# Patient Record
Sex: FEMALE | Race: BLACK OR AFRICAN AMERICAN | NOT HISPANIC OR LATINO
[De-identification: names, ages, dates, MRNs, and addresses within clinical notes are randomized per-mention and may not be internally consistent; named-entity substitution may affect disease eponyms.]

---

## 2017-02-03 ENCOUNTER — APPOINTMENT (OUTPATIENT)
Dept: ULTRASOUND IMAGING | Facility: CLINIC | Age: 27
End: 2017-02-03

## 2017-02-03 ENCOUNTER — OUTPATIENT (OUTPATIENT)
Dept: OUTPATIENT SERVICES | Facility: HOSPITAL | Age: 27
LOS: 1 days | End: 2017-02-03
Payer: COMMERCIAL

## 2017-02-03 DIAGNOSIS — N63 UNSPECIFIED LUMP IN BREAST: ICD-10-CM

## 2017-02-03 PROCEDURE — 76642 ULTRASOUND BREAST LIMITED: CPT

## 2017-02-14 ENCOUNTER — APPOINTMENT (OUTPATIENT)
Dept: INTERNAL MEDICINE | Facility: CLINIC | Age: 27
End: 2017-02-14

## 2017-02-14 VITALS
TEMPERATURE: 98 F | OXYGEN SATURATION: 98 % | DIASTOLIC BLOOD PRESSURE: 80 MMHG | HEART RATE: 92 BPM | HEIGHT: 64 IN | BODY MASS INDEX: 22.02 KG/M2 | SYSTOLIC BLOOD PRESSURE: 120 MMHG | WEIGHT: 129 LBS

## 2017-02-14 DIAGNOSIS — D72.819 DECREASED WHITE BLOOD CELL COUNT, UNSPECIFIED: ICD-10-CM

## 2017-02-15 LAB
25(OH)D3 SERPL-MCNC: 15.1 NG/ML
BASOPHILS # BLD AUTO: 0.01 K/UL
BASOPHILS NFR BLD AUTO: 0.2 %
C TRACH DNA SPEC QL NAA+PROBE: NORMAL
C TRACH RRNA SPEC QL NAA+PROBE: NORMAL
EOSINOPHIL # BLD AUTO: 0.01 K/UL
EOSINOPHIL NFR BLD AUTO: 0.2 %
HCT VFR BLD CALC: 38.2 %
HGB BLD-MCNC: 12.1 G/DL
HIV1+2 AB SPEC QL IA.RAPID: NONREACTIVE
IMM GRANULOCYTES NFR BLD AUTO: 0 %
LYMPHOCYTES # BLD AUTO: 1.81 K/UL
LYMPHOCYTES NFR BLD AUTO: 44 %
MAN DIFF?: NORMAL
MCHC RBC-ENTMCNC: 30.5 PG
MCHC RBC-ENTMCNC: 31.7 GM/DL
MCV RBC AUTO: 96.2 FL
MONOCYTES # BLD AUTO: 0.3 K/UL
MONOCYTES NFR BLD AUTO: 7.3 %
N GONORRHOEA DNA SPEC QL NAA+PROBE: NORMAL
N GONORRHOEA RRNA SPEC QL NAA+PROBE: NORMAL
NEUTROPHILS # BLD AUTO: 1.98 K/UL
NEUTROPHILS NFR BLD AUTO: 48.3 %
PLATELET # BLD AUTO: 236 K/UL
RBC # BLD: 3.97 M/UL
RBC # FLD: 12.6 %
SICKLE CELLS BLD QL SMEAR: NEGATIVE
SOURCE AMPLIFICATION: NORMAL
T PALLIDUM AB SER QL IA: NEGATIVE
WBC # FLD AUTO: 4.11 K/UL

## 2018-02-06 ENCOUNTER — APPOINTMENT (OUTPATIENT)
Dept: INTERNAL MEDICINE | Facility: CLINIC | Age: 28
End: 2018-02-06
Payer: COMMERCIAL

## 2018-02-06 VITALS
BODY MASS INDEX: 22.53 KG/M2 | HEIGHT: 64 IN | DIASTOLIC BLOOD PRESSURE: 70 MMHG | TEMPERATURE: 98.8 F | HEART RATE: 104 BPM | WEIGHT: 132 LBS | SYSTOLIC BLOOD PRESSURE: 120 MMHG | OXYGEN SATURATION: 98 %

## 2018-02-06 LAB — S PYO AG SPEC QL IA: NEGATIVE

## 2018-02-06 PROCEDURE — 87880 STREP A ASSAY W/OPTIC: CPT | Mod: QW

## 2018-02-06 PROCEDURE — 99213 OFFICE O/P EST LOW 20 MIN: CPT | Mod: 25

## 2018-02-06 RX ORDER — HYDROCORTISONE VALERATE 2 MG/G
0.2 CREAM TOPICAL
Qty: 15 | Refills: 0 | Status: DISCONTINUED | COMMUNITY
Start: 2017-09-08

## 2018-02-06 RX ORDER — ONDANSETRON 8 MG/1
8 TABLET, ORALLY DISINTEGRATING ORAL
Qty: 8 | Refills: 0 | Status: DISCONTINUED | COMMUNITY
Start: 2018-01-22

## 2018-02-06 RX ORDER — AMOXICILLIN 875 MG/1
875 TABLET, FILM COATED ORAL
Qty: 20 | Refills: 0 | Status: DISCONTINUED | COMMUNITY
Start: 2018-02-03

## 2018-02-06 RX ORDER — PREDNISONE 20 MG/1
20 TABLET ORAL
Qty: 9 | Refills: 0 | Status: DISCONTINUED | COMMUNITY
Start: 2017-09-08

## 2018-02-21 ENCOUNTER — APPOINTMENT (OUTPATIENT)
Dept: INTERNAL MEDICINE | Facility: CLINIC | Age: 28
End: 2018-02-21
Payer: COMMERCIAL

## 2018-02-21 VITALS
SYSTOLIC BLOOD PRESSURE: 110 MMHG | HEIGHT: 64 IN | TEMPERATURE: 98.3 F | WEIGHT: 134 LBS | DIASTOLIC BLOOD PRESSURE: 72 MMHG | OXYGEN SATURATION: 98 % | BODY MASS INDEX: 22.88 KG/M2 | HEART RATE: 98 BPM

## 2018-02-21 PROCEDURE — 99395 PREV VISIT EST AGE 18-39: CPT | Mod: 25

## 2018-02-21 PROCEDURE — 36415 COLL VENOUS BLD VENIPUNCTURE: CPT

## 2018-02-21 RX ORDER — BENZONATATE 100 MG/1
100 CAPSULE ORAL 3 TIMES DAILY
Qty: 30 | Refills: 1 | Status: DISCONTINUED | COMMUNITY
Start: 2018-02-06 | End: 2018-02-21

## 2018-02-21 RX ORDER — LORATADINE 5 MG/5 ML
10-240 SOLUTION, ORAL ORAL DAILY
Qty: 1 | Refills: 0 | Status: DISCONTINUED | COMMUNITY
Start: 2018-02-06 | End: 2018-02-21

## 2018-02-22 LAB
ALBUMIN SERPL ELPH-MCNC: 4.7 G/DL
ALP BLD-CCNC: 53 U/L
ALT SERPL-CCNC: 12 U/L
ANION GAP SERPL CALC-SCNC: 16 MMOL/L
AST SERPL-CCNC: 17 U/L
BASOPHILS # BLD AUTO: 0.01 K/UL
BASOPHILS NFR BLD AUTO: 0.2 %
BILIRUB SERPL-MCNC: 0.3 MG/DL
BUN SERPL-MCNC: 14 MG/DL
CALCIUM SERPL-MCNC: 9.8 MG/DL
CHLORIDE SERPL-SCNC: 102 MMOL/L
CO2 SERPL-SCNC: 20 MMOL/L
CREAT SERPL-MCNC: 0.72 MG/DL
EOSINOPHIL # BLD AUTO: 0.03 K/UL
EOSINOPHIL NFR BLD AUTO: 0.5 %
GLUCOSE SERPL-MCNC: 79 MG/DL
HBA1C MFR BLD HPLC: 5.6 %
HCT VFR BLD CALC: 37.1 %
HGB BLD-MCNC: 12.1 G/DL
HIV1+2 AB SPEC QL IA.RAPID: NONREACTIVE
IMM GRANULOCYTES NFR BLD AUTO: 0 %
LYMPHOCYTES # BLD AUTO: 2.86 K/UL
LYMPHOCYTES NFR BLD AUTO: 50.7 %
MAN DIFF?: NORMAL
MCHC RBC-ENTMCNC: 30.2 PG
MCHC RBC-ENTMCNC: 32.6 GM/DL
MCV RBC AUTO: 92.5 FL
MONOCYTES # BLD AUTO: 0.37 K/UL
MONOCYTES NFR BLD AUTO: 6.6 %
NEUTROPHILS # BLD AUTO: 2.37 K/UL
NEUTROPHILS NFR BLD AUTO: 42 %
PLATELET # BLD AUTO: 248 K/UL
POTASSIUM SERPL-SCNC: 4.1 MMOL/L
PROT SERPL-MCNC: 8.1 G/DL
RBC # BLD: 4.01 M/UL
RBC # FLD: 13.3 %
SODIUM SERPL-SCNC: 138 MMOL/L
T PALLIDUM AB SER QL IA: NEGATIVE
TSH SERPL-ACNC: 0.8 UIU/ML
WBC # FLD AUTO: 5.64 K/UL

## 2018-02-23 LAB
C TRACH RRNA SPEC QL NAA+PROBE: NOT DETECTED
N GONORRHOEA RRNA SPEC QL NAA+PROBE: NOT DETECTED
SOURCE AMPLIFICATION: NORMAL

## 2018-02-24 LAB
HGB A MFR BLD: 97.1 %
HGB A2 MFR BLD: 2.9 %
HGB FRACT BLD-IMP: NORMAL

## 2018-03-12 ENCOUNTER — APPOINTMENT (OUTPATIENT)
Dept: INTERNAL MEDICINE | Facility: CLINIC | Age: 28
End: 2018-03-12

## 2018-03-19 ENCOUNTER — APPOINTMENT (OUTPATIENT)
Dept: INTERNAL MEDICINE | Facility: CLINIC | Age: 28
End: 2018-03-19
Payer: COMMERCIAL

## 2018-03-19 PROCEDURE — 90715 TDAP VACCINE 7 YRS/> IM: CPT

## 2018-03-19 PROCEDURE — 90471 IMMUNIZATION ADMIN: CPT

## 2018-07-30 ENCOUNTER — RESULT REVIEW (OUTPATIENT)
Age: 28
End: 2018-07-30

## 2018-08-01 ENCOUNTER — APPOINTMENT (OUTPATIENT)
Dept: INTERNAL MEDICINE | Facility: CLINIC | Age: 28
End: 2018-08-01
Payer: COMMERCIAL

## 2018-08-01 VITALS
TEMPERATURE: 98.6 F | WEIGHT: 132 LBS | BODY MASS INDEX: 22.53 KG/M2 | HEART RATE: 95 BPM | SYSTOLIC BLOOD PRESSURE: 108 MMHG | DIASTOLIC BLOOD PRESSURE: 70 MMHG | HEIGHT: 64 IN | OXYGEN SATURATION: 98 %

## 2018-08-01 PROCEDURE — 99213 OFFICE O/P EST LOW 20 MIN: CPT

## 2018-08-01 NOTE — ASSESSMENT
[FreeTextEntry1] : # GERD \par \par - lifestyle changes stressed / do not eat late / avoid fatty foods and caffiene tracee late in the day\par - start PPI \par - Bc she c/o spasms and food going down slowly , will refer to GI \par

## 2018-08-01 NOTE — PHYSICAL EXAM
[No Acute Distress] : no acute distress [Well Nourished] : well nourished [Well Developed] : well developed [Supple] : supple [No Respiratory Distress] : no respiratory distress  [Clear to Auscultation] : lungs were clear to auscultation bilaterally [Normal Rate] : normal rate  [Regular Rhythm] : with a regular rhythm [Normal S1, S2] : normal S1 and S2 [Soft] : abdomen soft [Non Tender] : non-tender [No HSM] : no HSM [Normal Bowel Sounds] : normal bowel sounds

## 2018-08-01 NOTE — HISTORY OF PRESENT ILLNESS
[FreeTextEntry8] : pt reports that for the last 3 days she has been having burning in the throat , and retrosternal area\par she denies any use of NSAID \par she reports that she feels as if her esophagus goes into "spasms" when she eats , bc the food goes down slowly , however she denies  any difficulty in swallowing solids \par she has been eating a lot of "junk" food , eats late \par eats a lot of chocolate \par \par LMP 7/16

## 2018-10-16 ENCOUNTER — APPOINTMENT (OUTPATIENT)
Dept: INTERNAL MEDICINE | Facility: CLINIC | Age: 28
End: 2018-10-16
Payer: COMMERCIAL

## 2018-10-16 VITALS
HEIGHT: 64 IN | WEIGHT: 134 LBS | OXYGEN SATURATION: 98 % | SYSTOLIC BLOOD PRESSURE: 104 MMHG | BODY MASS INDEX: 22.88 KG/M2 | DIASTOLIC BLOOD PRESSURE: 80 MMHG | HEART RATE: 76 BPM

## 2018-10-16 PROCEDURE — 99213 OFFICE O/P EST LOW 20 MIN: CPT

## 2018-10-16 NOTE — HISTORY OF PRESENT ILLNESS
[FreeTextEntry1] : pt was seen in ED bc swollen LN \par she was started on augmentin , now  resolved \par she also reports that the GERD got better with change in diet \par \par the PPI helped but still has occasional issues \par \par no dysphagia\par \par

## 2018-12-05 ENCOUNTER — APPOINTMENT (OUTPATIENT)
Dept: INTERNAL MEDICINE | Facility: CLINIC | Age: 28
End: 2018-12-05
Payer: COMMERCIAL

## 2018-12-05 VITALS
DIASTOLIC BLOOD PRESSURE: 80 MMHG | BODY MASS INDEX: 22.88 KG/M2 | HEIGHT: 64 IN | OXYGEN SATURATION: 98 % | WEIGHT: 134 LBS | HEART RATE: 81 BPM | SYSTOLIC BLOOD PRESSURE: 112 MMHG

## 2018-12-05 PROCEDURE — 99213 OFFICE O/P EST LOW 20 MIN: CPT

## 2018-12-05 NOTE — HISTORY OF PRESENT ILLNESS
[FreeTextEntry8] : pt reports that she has numbness in both hands tracee the R >L \par Comes and goes \par no weakness \par works in the dietary department , \par \par \par reports she still has some epigastric pressure when she eats fatty food \par did not see GI \par

## 2018-12-05 NOTE — ASSESSMENT
[FreeTextEntry1] : 1) numbness of both hands / intermittent / Tinel negative\par - neuro eval\par \par 2) GERD ? abd "pressure in the epigastric area" \par - US \par - GI follow up - pt refuses at present \par \par 3) L hip pain \par - x-ray L hip and pelvis

## 2018-12-05 NOTE — PHYSICAL EXAM
[No Acute Distress] : no acute distress [Well Nourished] : well nourished [Well Developed] : well developed [No JVD] : no jugular venous distention [Supple] : supple [No Respiratory Distress] : no respiratory distress  [Clear to Auscultation] : lungs were clear to auscultation bilaterally [No Accessory Muscle Use] : no accessory muscle use [Normal Rate] : normal rate  [Regular Rhythm] : with a regular rhythm [Normal S1, S2] : normal S1 and S2 [No Edema] : there was no peripheral edema [Soft] : abdomen soft [Non Tender] : non-tender [No HSM] : no HSM [Normal Bowel Sounds] : normal bowel sounds

## 2019-04-17 ENCOUNTER — APPOINTMENT (OUTPATIENT)
Dept: INTERNAL MEDICINE | Facility: CLINIC | Age: 29
End: 2019-04-17
Payer: COMMERCIAL

## 2019-04-17 VITALS
BODY MASS INDEX: 21.68 KG/M2 | TEMPERATURE: 98.5 F | HEIGHT: 64 IN | WEIGHT: 127 LBS | DIASTOLIC BLOOD PRESSURE: 72 MMHG | SYSTOLIC BLOOD PRESSURE: 130 MMHG | HEART RATE: 91 BPM | OXYGEN SATURATION: 98 %

## 2019-04-17 DIAGNOSIS — Z83.2 FAMILY HISTORY OF DISEASES OF THE BLOOD AND BLOOD-FORMING ORGANS AND CERTAIN DISORDERS INVOLVING THE IMMUNE MECHANISM: ICD-10-CM

## 2019-04-17 DIAGNOSIS — Z83.3 FAMILY HISTORY OF DIABETES MELLITUS: ICD-10-CM

## 2019-04-17 PROCEDURE — 36415 COLL VENOUS BLD VENIPUNCTURE: CPT

## 2019-04-17 PROCEDURE — 99395 PREV VISIT EST AGE 18-39: CPT | Mod: 25

## 2019-04-17 NOTE — HISTORY OF PRESENT ILLNESS
[FreeTextEntry1] : CPE \par \par  still has some occasional GERD , but only when she eats " junk' \par no dysphagia , no vomiting \par  also has occasional low back , L groin pain "tightness" \par

## 2019-04-17 NOTE — ASSESSMENT
[FreeTextEntry1] : # CPE \par \par - diet / exercise discussed \par - UTD PAP ./ Tdap \par -check labs/ lipid and A1C \par - STD screen \par \par -refuses to go for US abd / x-ray ( see last note)

## 2019-04-17 NOTE — HEALTH RISK ASSESSMENT
[Good] : ~his/her~  mood as  good [] : Yes [de-identified] : occasionally  [0] : 2) Feeling down, depressed, or hopeless: Not at all (0) [de-identified] : N  [de-identified] : eats a lot of junk food  [PapSmearDate] : 2018

## 2019-04-17 NOTE — PHYSICAL EXAM
[Well Nourished] : well nourished [No Acute Distress] : no acute distress [Normal Sclera/Conjunctiva] : normal sclera/conjunctiva [Well-Appearing] : well-appearing [Well Developed] : well developed [EOMI] : extraocular movements intact [PERRL] : pupils equal round and reactive to light [Normal Outer Ear/Nose] : the outer ears and nose were normal in appearance [No JVD] : no jugular venous distention [Supple] : supple [Normal Oropharynx] : the oropharynx was normal [No Respiratory Distress] : no respiratory distress  [Thyroid Normal, No Nodules] : the thyroid was normal and there were no nodules present [No Lymphadenopathy] : no lymphadenopathy [Clear to Auscultation] : lungs were clear to auscultation bilaterally [No Accessory Muscle Use] : no accessory muscle use [Regular Rhythm] : with a regular rhythm [Normal Rate] : normal rate  [Normal S1, S2] : normal S1 and S2 [No Edema] : there was no peripheral edema [Non Tender] : non-tender [No Extremity Clubbing/Cyanosis] : no extremity clubbing/cyanosis [Soft] : abdomen soft [Non-distended] : non-distended [No HSM] : no HSM [Normal Bowel Sounds] : normal bowel sounds [Normal Posterior Cervical Nodes] : no posterior cervical lymphadenopathy [Normal Supraclavicular Nodes] : no supraclavicular lymphadenopathy [Normal Anterior Cervical Nodes] : no anterior cervical lymphadenopathy [No Spinal Tenderness] : no spinal tenderness [No Joint Swelling] : no joint swelling [No CVA Tenderness] : no CVA  tenderness [No Rash] : no rash [Grossly Normal Strength/Tone] : grossly normal strength/tone [Normal Gait] : normal gait [No Skin Lesions] : no skin lesions [Coordination Grossly Intact] : coordination grossly intact [Memory Grossly Normal] : memory grossly normal [Speech Grossly Normal] : speech grossly normal [No Focal Deficits] : no focal deficits [Normal Mood] : the mood was normal [Normal Insight/Judgement] : insight and judgment were intact

## 2019-04-18 LAB
CHOLEST SERPL-MCNC: 153 MG/DL
CHOLEST/HDLC SERPL: 2.3 RATIO
ESTIMATED AVERAGE GLUCOSE: 114 MG/DL
HBA1C MFR BLD HPLC: 5.6 %
HDLC SERPL-MCNC: 66 MG/DL
HIV1+2 AB SPEC QL IA.RAPID: NONREACTIVE
LDLC SERPL CALC-MCNC: 81 MG/DL
T PALLIDUM AB SER QL IA: NEGATIVE
TRIGL SERPL-MCNC: 29 MG/DL
TSH SERPL-ACNC: 1.06 UIU/ML

## 2020-01-21 ENCOUNTER — APPOINTMENT (OUTPATIENT)
Dept: INTERNAL MEDICINE | Facility: CLINIC | Age: 30
End: 2020-01-21
Payer: COMMERCIAL

## 2020-01-21 VITALS
BODY MASS INDEX: 21.51 KG/M2 | SYSTOLIC BLOOD PRESSURE: 132 MMHG | HEART RATE: 84 BPM | TEMPERATURE: 98.5 F | HEIGHT: 64 IN | OXYGEN SATURATION: 98 % | WEIGHT: 126 LBS | DIASTOLIC BLOOD PRESSURE: 80 MMHG

## 2020-01-21 DIAGNOSIS — R20.2 PARESTHESIA OF SKIN: ICD-10-CM

## 2020-01-21 PROCEDURE — 99213 OFFICE O/P EST LOW 20 MIN: CPT

## 2020-01-21 RX ORDER — CHOLECALCIFEROL (VITAMIN D3) 1250 MCG
1.25 MG CAPSULE ORAL
Qty: 12 | Refills: 0 | Status: DISCONTINUED | COMMUNITY
Start: 2017-02-15 | End: 2020-01-21

## 2020-01-21 RX ORDER — OMEPRAZOLE 40 MG/1
40 CAPSULE, DELAYED RELEASE ORAL
Qty: 30 | Refills: 1 | Status: DISCONTINUED | COMMUNITY
Start: 2018-08-01 | End: 2020-01-21

## 2020-01-21 NOTE — REVIEW OF SYSTEMS
[Fatigue] : fatigue [Negative] : Respiratory [de-identified] : hives intermittently  [de-identified] : see H 7 P

## 2020-01-21 NOTE — HISTORY OF PRESENT ILLNESS
[FreeTextEntry1] : here for multiple concerns\par \par  [de-identified] : 1) has had  hives on the face twice last week \par - no new product\par - no new dietary addition , goes away by itself , after an hour \par \par 2) reports that BP has been high on mom's machine but did not bring log \par \par 3) reports that she still has numbness in the hands \par - now has numbness intermittently on the cheeks\par - sometimes she numbness in the hands / legs after she is laying down \par - she has googled symptoms and is concerned abt MS \par \par periods are regular \par labs from last visit were normal

## 2020-01-21 NOTE — ASSESSMENT
[FreeTextEntry1] : 1) paresthesia \par - some of the symptoms she describes are possibly just due to pressure during sleep etc , but she also has persistent numbness on the hands \par - labs reviewed \par - pt very concerned abt MS , adv that imaging will be unlikely to help given lack of any focal findings\par - will refer to neuro \par - many of the symp seem related to anxiety \par \par 2) rash \par - possible mild urticarial reaction \par - may have had a viral infection - will start zyrtex x 2 week \par

## 2020-02-05 ENCOUNTER — APPOINTMENT (OUTPATIENT)
Dept: DERMATOLOGY | Facility: CLINIC | Age: 30
End: 2020-02-05
Payer: COMMERCIAL

## 2020-02-05 VITALS — BODY MASS INDEX: 21.51 KG/M2 | HEIGHT: 64 IN | WEIGHT: 126 LBS

## 2020-02-05 DIAGNOSIS — L65.8 OTHER SPECIFIED NONSCARRING HAIR LOSS: ICD-10-CM

## 2020-02-05 PROCEDURE — 11901 INJECT SKIN LESIONS >7: CPT

## 2020-02-05 PROCEDURE — 99203 OFFICE O/P NEW LOW 30 MIN: CPT | Mod: 25

## 2020-02-14 ENCOUNTER — APPOINTMENT (OUTPATIENT)
Dept: INTERNAL MEDICINE | Facility: CLINIC | Age: 30
End: 2020-02-14
Payer: COMMERCIAL

## 2020-02-14 VITALS
SYSTOLIC BLOOD PRESSURE: 130 MMHG | OXYGEN SATURATION: 99 % | TEMPERATURE: 98.8 F | DIASTOLIC BLOOD PRESSURE: 74 MMHG | HEART RATE: 88 BPM

## 2020-02-14 DIAGNOSIS — J30.9 ALLERGIC RHINITIS, UNSPECIFIED: ICD-10-CM

## 2020-02-14 DIAGNOSIS — Z87.898 PERSONAL HISTORY OF OTHER SPECIFIED CONDITIONS: ICD-10-CM

## 2020-02-14 DIAGNOSIS — R21 RASH AND OTHER NONSPECIFIC SKIN ERUPTION: ICD-10-CM

## 2020-02-14 DIAGNOSIS — Z23 ENCOUNTER FOR IMMUNIZATION: ICD-10-CM

## 2020-02-14 DIAGNOSIS — M25.552 PAIN IN LEFT HIP: ICD-10-CM

## 2020-02-14 PROCEDURE — 99213 OFFICE O/P EST LOW 20 MIN: CPT

## 2020-02-14 NOTE — REVIEW OF SYSTEMS
[Fever] : no fever [Chills] : no chills [Fatigue] : no fatigue [Night Sweats] : no night sweats [Earache] : no earache [Hoarseness] : no hoarseness [Nasal Discharge] : no nasal discharge [Sore Throat] : no sore throat [Postnasal Drip] : no postnasal drip [Dizziness] : no dizziness [Confusion] : no confusion [Negative] : Psychiatric [FreeTextEntry4] : see HPI  [de-identified] : rash resolved

## 2020-02-14 NOTE — HISTORY OF PRESENT ILLNESS
[FreeTextEntry8] : Presents with concern of sinus issues.  \dona Reports longs standing h/o congestion, tends to be worse in cold months.  Inhalation of warm steam helps.  \par Head felt particularly heavy yesterday and mildly scratchy throat.  Used steam yesterday, so head heaviness much better today.  \par Cat, mold, grass allergies (has a cat at home).  Denies fever/chills/sweats, nasal discharge, cough, ear pain, facial pain. \par Used nasal spray in the past, but caused nose bleed. \par Tried [place] pot in the past, but thinks it caused her to feel more clogged. \par Tried Sudafed in the past, but made her feel depressed.

## 2020-02-14 NOTE — PHYSICAL EXAM
[Normal TMs] : both tympanic membranes were normal [Normal] : normal rate, regular rhythm, normal S1 and S2 and no murmur heard

## 2020-02-14 NOTE — ASSESSMENT
[FreeTextEntry1] : Sinus congestion:\par Restart Zyrtec, continue steam/warm compresses\par Can add NSAID for a few days\par Does not do well with nasal spray, nasal saline rinse or oral decongestants\par Call/RTO if purulent nasal discharge, facial pain, worsening headache, fever/chills/sweats\par

## 2020-04-25 ENCOUNTER — MESSAGE (OUTPATIENT)
Age: 30
End: 2020-04-25

## 2020-04-30 ENCOUNTER — APPOINTMENT (OUTPATIENT)
Dept: INTERNAL MEDICINE | Facility: CLINIC | Age: 30
End: 2020-04-30
Payer: COMMERCIAL

## 2020-04-30 DIAGNOSIS — R22.1 LOCALIZED SWELLING, MASS AND LUMP, NECK: ICD-10-CM

## 2020-04-30 PROCEDURE — 99213 OFFICE O/P EST LOW 20 MIN: CPT | Mod: 95

## 2020-04-30 NOTE — HISTORY OF PRESENT ILLNESS
[Home] : at home, [unfilled] , at the time of the visit. [Self] : self [Medical Office: ([facility])___] : at the medical office located in  [Patient] : the patient [FreeTextEntry8] : reports that for 2 months , she has felt that the LN behind her ears are "swollen and tender" \par no fever / cough / sinus pain / ear pain \par mild intermittent throat discomfort and mild cough \par \par she works at [employer] in [employer] and is anxious abt the Covid situation \par she reports that when she gets stressed the LN swell and the area behind the ears feels "tight' \par no other lumps felt by pt in the front of the neck

## 2020-04-30 NOTE — ASSESSMENT
[FreeTextEntry1] : I think that she is referring to  the bony landmarks behind the ears \par she possibly has some tension in the muscles there , considering she says that the issue gets worse with stress \par trial of ibuprofen x 5 days \par needs in person visit for examination

## 2020-04-30 NOTE — PHYSICAL EXAM
[de-identified] : TRIED TO SHOW ME THE AREA VIA VIDEO - I think she is pointing to bony tissue  [Normal] : no acute distress, well nourished, well developed and well-appearing

## 2020-05-13 LAB
SARS-COV-2 IGG SERPL IA-ACNC: 0.1 INDEX
SARS-COV-2 IGG SERPL QL IA: NEGATIVE

## 2020-05-19 ENCOUNTER — APPOINTMENT (OUTPATIENT)
Dept: INTERNAL MEDICINE | Facility: CLINIC | Age: 30
End: 2020-05-19
Payer: COMMERCIAL

## 2020-05-19 VITALS
SYSTOLIC BLOOD PRESSURE: 115 MMHG | BODY MASS INDEX: 22.2 KG/M2 | TEMPERATURE: 98.4 F | HEIGHT: 64 IN | OXYGEN SATURATION: 98 % | HEART RATE: 84 BPM | WEIGHT: 130 LBS | DIASTOLIC BLOOD PRESSURE: 80 MMHG

## 2020-05-19 PROCEDURE — 99395 PREV VISIT EST AGE 18-39: CPT | Mod: 25

## 2020-05-19 PROCEDURE — 36415 COLL VENOUS BLD VENIPUNCTURE: CPT

## 2020-05-19 NOTE — HEALTH RISK ASSESSMENT
[Yes] : Yes [Good] : ~his/her~  mood as  good [] : No [Monthly or less (1 pt)] : Monthly or less (1 point) [1 or 2 (0 pts)] : 1 or 2 (0 points) [Never (0 pts)] : Never (0 points) [0] : 2) Feeling down, depressed, or hopeless: Not at all (0) [Change in mental status noted] : No change in mental status noted [Employed] : employed [None] : None [Single] : single [Sexually Active] : sexually active

## 2020-05-19 NOTE — HISTORY OF PRESENT ILLNESS
[FreeTextEntry1] : CPE \par \par  under a lot of stress / works at [employer] \par denies any fever / cough / SOB \par

## 2020-05-19 NOTE — ASSESSMENT
[FreeTextEntry1] : CPE \par - diet / exercise discussed \par - check labs\par - needs pap \par - UTD tadp \par - adv optho of/u - says she has occaisonal floaters

## 2020-05-20 LAB
25(OH)D3 SERPL-MCNC: 18.5 NG/ML
ALBUMIN SERPL ELPH-MCNC: 4.5 G/DL
ALP BLD-CCNC: 54 U/L
ALT SERPL-CCNC: 15 U/L
ANION GAP SERPL CALC-SCNC: 13 MMOL/L
AST SERPL-CCNC: 22 U/L
BASOPHILS # BLD AUTO: 0.01 K/UL
BASOPHILS NFR BLD AUTO: 0.2 %
BILIRUB SERPL-MCNC: 0.3 MG/DL
BUN SERPL-MCNC: 11 MG/DL
CALCIUM SERPL-MCNC: 9.2 MG/DL
CHLORIDE SERPL-SCNC: 100 MMOL/L
CHOLEST SERPL-MCNC: 142 MG/DL
CHOLEST/HDLC SERPL: 2.3 RATIO
CO2 SERPL-SCNC: 23 MMOL/L
CREAT SERPL-MCNC: 0.71 MG/DL
EOSINOPHIL # BLD AUTO: 0.03 K/UL
EOSINOPHIL NFR BLD AUTO: 0.6 %
ESTIMATED AVERAGE GLUCOSE: 111 MG/DL
GLUCOSE SERPL-MCNC: 83 MG/DL
HBA1C MFR BLD HPLC: 5.5 %
HCT VFR BLD CALC: 40.6 %
HDLC SERPL-MCNC: 62 MG/DL
HGB BLD-MCNC: 13 G/DL
IMM GRANULOCYTES NFR BLD AUTO: 0 %
LDLC SERPL CALC-MCNC: 71 MG/DL
LYMPHOCYTES # BLD AUTO: 2.58 K/UL
LYMPHOCYTES NFR BLD AUTO: 49.9 %
MAN DIFF?: NORMAL
MCHC RBC-ENTMCNC: 30.3 PG
MCHC RBC-ENTMCNC: 32 GM/DL
MCV RBC AUTO: 94.6 FL
MONOCYTES # BLD AUTO: 0.34 K/UL
MONOCYTES NFR BLD AUTO: 6.6 %
NEUTROPHILS # BLD AUTO: 2.21 K/UL
NEUTROPHILS NFR BLD AUTO: 42.7 %
PLATELET # BLD AUTO: 248 K/UL
POTASSIUM SERPL-SCNC: 4.5 MMOL/L
PROT SERPL-MCNC: 7.4 G/DL
RBC # BLD: 4.29 M/UL
RBC # FLD: 11.8 %
SODIUM SERPL-SCNC: 136 MMOL/L
TRIGL SERPL-MCNC: 44 MG/DL
TSH SERPL-ACNC: 0.94 UIU/ML
WBC # FLD AUTO: 5.17 K/UL

## 2020-07-07 ENCOUNTER — APPOINTMENT (OUTPATIENT)
Dept: INTERNAL MEDICINE | Facility: CLINIC | Age: 30
End: 2020-07-07

## 2020-07-28 RX ORDER — CHOLECALCIFEROL (VITAMIN D3) 1250 MCG
1.25 MG CAPSULE ORAL
Qty: 12 | Refills: 0 | Status: DISCONTINUED | COMMUNITY
Start: 2020-05-20 | End: 2020-07-28

## 2020-09-09 ENCOUNTER — OUTPATIENT (OUTPATIENT)
Dept: OUTPATIENT SERVICES | Facility: HOSPITAL | Age: 30
LOS: 1 days | End: 2020-09-09
Payer: COMMERCIAL

## 2020-09-09 ENCOUNTER — APPOINTMENT (OUTPATIENT)
Dept: ULTRASOUND IMAGING | Facility: IMAGING CENTER | Age: 30
End: 2020-09-09
Payer: COMMERCIAL

## 2020-09-09 DIAGNOSIS — N64.4 MASTODYNIA: ICD-10-CM

## 2020-09-09 PROCEDURE — 76641 ULTRASOUND BREAST COMPLETE: CPT | Mod: 26,LT

## 2020-09-09 PROCEDURE — 76641 ULTRASOUND BREAST COMPLETE: CPT

## 2020-09-21 ENCOUNTER — APPOINTMENT (OUTPATIENT)
Dept: INTERNAL MEDICINE | Facility: CLINIC | Age: 30
End: 2020-09-21
Payer: COMMERCIAL

## 2020-09-21 VITALS
TEMPERATURE: 98.4 F | OXYGEN SATURATION: 98 % | HEART RATE: 72 BPM | SYSTOLIC BLOOD PRESSURE: 120 MMHG | DIASTOLIC BLOOD PRESSURE: 74 MMHG | HEIGHT: 64 IN | BODY MASS INDEX: 22.02 KG/M2 | WEIGHT: 129 LBS

## 2020-09-21 DIAGNOSIS — Z11.59 ENCOUNTER FOR SCREENING FOR OTHER VIRAL DISEASES: ICD-10-CM

## 2020-09-21 PROCEDURE — 99213 OFFICE O/P EST LOW 20 MIN: CPT

## 2020-09-21 NOTE — HISTORY OF PRESENT ILLNESS
[de-identified] : Presents for follow up.  \par \par Had started vitamin D supplement (50,000 IU weekly) and shortly after she developed some right sided groin pain.  THinks she had taken 2-3 pills.  \par Had sono at gyn, which was normal.  Pain resolved and she tried lower dose of vitamin D3 supplement.  First the 2,000 IU, then 1,000 IU, pain started again after a few doses.  \par \par Last took Vitamin D about 3 weeks.  None the the groin pain since.  Thinks the pain resolved within a few days up to 1 week after taking the supplement.  \par \par Did go to urgent care for the symptoms about 2 weeks ago.  Had been suggested to take magnesium supplement, but did not start yet.  \par \par Describes the pain as if something stuck, overall a mild discomfort.  Does not recall the pain being aggravated by any movements.  \par \par Has since been trying to get some more sunlight, drinking more milk, eating salmon.  \par \par Walks daily for 30-40 min.  No weight training.

## 2020-09-21 NOTE — ASSESSMENT
[FreeTextEntry1] : RLQ/groin pain:\par Occurred after taking vitamin D supplements\par Reports normal sono with gyn\par Will check labs\par If reoccurs consider additional imaging\par

## 2020-09-21 NOTE — REVIEW OF SYSTEMS
[Fever] : no fever [Chills] : no chills [Fatigue] : no fatigue [Night Sweats] : no night sweats [Nausea] : no nausea [Constipation] : no constipation [Diarrhea] : diarrhea [Vomiting] : no vomiting [Heartburn] : no heartburn [Negative] : Neurological [FreeTextEntry7] : see HPI - RLQ pain, now resolved

## 2020-09-23 LAB
25(OH)D3 SERPL-MCNC: 27.8 NG/ML
ALBUMIN SERPL ELPH-MCNC: 4.6 G/DL
ALP BLD-CCNC: 53 U/L
ALT SERPL-CCNC: 12 U/L
ANION GAP SERPL CALC-SCNC: 9 MMOL/L
AST SERPL-CCNC: 20 U/L
BASOPHILS # BLD AUTO: 0.02 K/UL
BASOPHILS NFR BLD AUTO: 0.3 %
BILIRUB SERPL-MCNC: 0.3 MG/DL
BUN SERPL-MCNC: 13 MG/DL
CALCIUM SERPL-MCNC: 9.5 MG/DL
CHLORIDE SERPL-SCNC: 101 MMOL/L
CO2 SERPL-SCNC: 27 MMOL/L
CREAT SERPL-MCNC: 0.66 MG/DL
EOSINOPHIL # BLD AUTO: 0.03 K/UL
EOSINOPHIL NFR BLD AUTO: 0.5 %
GLUCOSE SERPL-MCNC: 62 MG/DL
HCT VFR BLD CALC: 39.9 %
HGB BLD-MCNC: 12.8 G/DL
IMM GRANULOCYTES NFR BLD AUTO: 0.2 %
LYMPHOCYTES # BLD AUTO: 2.05 K/UL
LYMPHOCYTES NFR BLD AUTO: 35.3 %
MAGNESIUM SERPL-MCNC: 2 MG/DL
MAN DIFF?: NORMAL
MCHC RBC-ENTMCNC: 30.8 PG
MCHC RBC-ENTMCNC: 32.1 GM/DL
MCV RBC AUTO: 95.9 FL
MONOCYTES # BLD AUTO: 0.45 K/UL
MONOCYTES NFR BLD AUTO: 7.8 %
NEUTROPHILS # BLD AUTO: 3.24 K/UL
NEUTROPHILS NFR BLD AUTO: 55.9 %
PLATELET # BLD AUTO: 229 K/UL
POTASSIUM SERPL-SCNC: 3.9 MMOL/L
PROT SERPL-MCNC: 7.4 G/DL
RBC # BLD: 4.16 M/UL
RBC # FLD: 12.6 %
SARS-COV-2 IGG SERPL IA-ACNC: 0.1 INDEX
SARS-COV-2 IGG SERPL QL IA: NEGATIVE
SODIUM SERPL-SCNC: 138 MMOL/L
WBC # FLD AUTO: 5.8 K/UL

## 2020-12-21 ENCOUNTER — APPOINTMENT (OUTPATIENT)
Dept: DERMATOLOGY | Facility: CLINIC | Age: 30
End: 2020-12-21
Payer: COMMERCIAL

## 2020-12-21 DIAGNOSIS — L66.9 CICATRICIAL ALOPECIA, UNSPECIFIED: ICD-10-CM

## 2020-12-21 PROCEDURE — 99072 ADDL SUPL MATRL&STAF TM PHE: CPT

## 2020-12-21 PROCEDURE — 99214 OFFICE O/P EST MOD 30 MIN: CPT

## 2020-12-31 ENCOUNTER — NON-APPOINTMENT (OUTPATIENT)
Age: 30
End: 2020-12-31

## 2021-03-08 ENCOUNTER — OUTPATIENT (OUTPATIENT)
Dept: OUTPATIENT SERVICES | Facility: HOSPITAL | Age: 31
LOS: 1 days | End: 2021-03-08
Payer: COMMERCIAL

## 2021-03-08 ENCOUNTER — APPOINTMENT (OUTPATIENT)
Dept: ULTRASOUND IMAGING | Facility: IMAGING CENTER | Age: 31
End: 2021-03-08
Payer: COMMERCIAL

## 2021-03-08 DIAGNOSIS — Z00.8 ENCOUNTER FOR OTHER GENERAL EXAMINATION: ICD-10-CM

## 2021-03-08 PROCEDURE — 76642 ULTRASOUND BREAST LIMITED: CPT

## 2021-03-08 PROCEDURE — 76642 ULTRASOUND BREAST LIMITED: CPT | Mod: 26,LT

## 2021-03-22 ENCOUNTER — APPOINTMENT (OUTPATIENT)
Dept: DERMATOLOGY | Facility: CLINIC | Age: 31
End: 2021-03-22
Payer: COMMERCIAL

## 2021-03-22 DIAGNOSIS — R61 GENERALIZED HYPERHIDROSIS: ICD-10-CM

## 2021-03-22 PROCEDURE — 99072 ADDL SUPL MATRL&STAF TM PHE: CPT

## 2021-03-22 PROCEDURE — 99214 OFFICE O/P EST MOD 30 MIN: CPT

## 2021-05-25 ENCOUNTER — APPOINTMENT (OUTPATIENT)
Dept: INTERNAL MEDICINE | Facility: CLINIC | Age: 31
End: 2021-05-25
Payer: COMMERCIAL

## 2021-05-25 VITALS
OXYGEN SATURATION: 98 % | WEIGHT: 130 LBS | SYSTOLIC BLOOD PRESSURE: 120 MMHG | HEIGHT: 64 IN | HEART RATE: 71 BPM | BODY MASS INDEX: 22.2 KG/M2 | DIASTOLIC BLOOD PRESSURE: 74 MMHG

## 2021-05-25 DIAGNOSIS — K21.9 GASTRO-ESOPHAGEAL REFLUX DISEASE W/OUT ESOPHAGITIS: ICD-10-CM

## 2021-05-25 PROCEDURE — 99072 ADDL SUPL MATRL&STAF TM PHE: CPT

## 2021-05-25 PROCEDURE — 99395 PREV VISIT EST AGE 18-39: CPT | Mod: 25

## 2021-05-25 PROCEDURE — 36415 COLL VENOUS BLD VENIPUNCTURE: CPT

## 2021-05-25 NOTE — ASSESSMENT
[FreeTextEntry1] : 1) CPE\par \par - diet /exercise discussed\par - check labs\par - will schedule pap \par - refuses vaccine\par \par 2) intermittent GERD \par - start prn PPI \par - lifestyle changes stressed \par \par 3) PT for back pain

## 2021-05-25 NOTE — HEALTH RISK ASSESSMENT
[Good] : ~his/her~  mood as  good [] : No [Yes] : Yes [Monthly or less (1 pt)] : Monthly or less (1 point) [0] : 2) Feeling down, depressed, or hopeless: Not at all (0) [de-identified] : minimal [de-identified] : regular  [Patient reported PAP Smear was normal] : Patient reported PAP Smear was normal [Change in mental status noted] : No change in mental status noted [None] : None [With Family] : lives with family [Employed] : employed [Single] : single [Sexually Active] : not sexually active [Reports changes in hearing] : Reports no changes in hearing [Reports changes in vision] : Reports no changes in vision [Reports changes in dental health] : Reports no changes in dental health [Smoke Detector] : smoke detector [Carbon Monoxide Detector] : carbon monoxide detector [Safety elements used in home] : safety elements used in home [Seat Belt] :  uses seat belt [PapSmearDate] : 2019

## 2021-05-26 LAB
25(OH)D3 SERPL-MCNC: 25.5 NG/ML
ALBUMIN SERPL ELPH-MCNC: 4.8 G/DL
ALP BLD-CCNC: 58 U/L
ALT SERPL-CCNC: 11 U/L
ANION GAP SERPL CALC-SCNC: 14 MMOL/L
AST SERPL-CCNC: 19 U/L
BASOPHILS # BLD AUTO: 0.02 K/UL
BASOPHILS NFR BLD AUTO: 0.4 %
BILIRUB SERPL-MCNC: 0.3 MG/DL
BUN SERPL-MCNC: 12 MG/DL
CALCIUM SERPL-MCNC: 9.8 MG/DL
CHLORIDE SERPL-SCNC: 100 MMOL/L
CHOLEST SERPL-MCNC: 160 MG/DL
CO2 SERPL-SCNC: 21 MMOL/L
CREAT SERPL-MCNC: 0.72 MG/DL
EOSINOPHIL # BLD AUTO: 0.02 K/UL
EOSINOPHIL NFR BLD AUTO: 0.4 %
ESTIMATED AVERAGE GLUCOSE: 114 MG/DL
GLUCOSE SERPL-MCNC: 77 MG/DL
HBA1C MFR BLD HPLC: 5.6 %
HCT VFR BLD CALC: 40.8 %
HDLC SERPL-MCNC: 63 MG/DL
HGB BLD-MCNC: 13.5 G/DL
IMM GRANULOCYTES NFR BLD AUTO: 0.2 %
LDLC SERPL CALC-MCNC: 89 MG/DL
LYMPHOCYTES # BLD AUTO: 2.33 K/UL
LYMPHOCYTES NFR BLD AUTO: 48.4 %
MAN DIFF?: NORMAL
MCHC RBC-ENTMCNC: 31 PG
MCHC RBC-ENTMCNC: 33.1 GM/DL
MCV RBC AUTO: 93.8 FL
MONOCYTES # BLD AUTO: 0.39 K/UL
MONOCYTES NFR BLD AUTO: 8.1 %
NEUTROPHILS # BLD AUTO: 2.04 K/UL
NEUTROPHILS NFR BLD AUTO: 42.5 %
NONHDLC SERPL-MCNC: 97 MG/DL
PLATELET # BLD AUTO: 223 K/UL
POTASSIUM SERPL-SCNC: 4.1 MMOL/L
PROT SERPL-MCNC: 7.9 G/DL
RBC # BLD: 4.35 M/UL
RBC # FLD: 12.2 %
SODIUM SERPL-SCNC: 136 MMOL/L
TRIGL SERPL-MCNC: 39 MG/DL
TSH SERPL-ACNC: 1.29 UIU/ML
WBC # FLD AUTO: 4.81 K/UL

## 2021-07-26 ENCOUNTER — APPOINTMENT (OUTPATIENT)
Dept: DERMATOLOGY | Facility: CLINIC | Age: 31
End: 2021-07-26

## 2021-08-18 ENCOUNTER — APPOINTMENT (OUTPATIENT)
Dept: ORTHOPEDIC SURGERY | Facility: CLINIC | Age: 31
End: 2021-08-18
Payer: COMMERCIAL

## 2021-08-18 VITALS — WEIGHT: 124 LBS | HEIGHT: 63 IN | BODY MASS INDEX: 21.97 KG/M2

## 2021-08-18 PROCEDURE — 72082 X-RAY EXAM ENTIRE SPI 2/3 VW: CPT

## 2021-08-18 PROCEDURE — 99204 OFFICE O/P NEW MOD 45 MIN: CPT

## 2021-09-23 DIAGNOSIS — R07.81 PLEURODYNIA: ICD-10-CM

## 2021-10-01 ENCOUNTER — OUTPATIENT (OUTPATIENT)
Dept: OUTPATIENT SERVICES | Facility: HOSPITAL | Age: 31
LOS: 1 days | End: 2021-10-01
Payer: COMMERCIAL

## 2021-10-01 ENCOUNTER — APPOINTMENT (OUTPATIENT)
Dept: RADIOLOGY | Facility: IMAGING CENTER | Age: 31
End: 2021-10-01
Payer: COMMERCIAL

## 2021-10-01 DIAGNOSIS — R07.81 PLEURODYNIA: ICD-10-CM

## 2021-10-01 DIAGNOSIS — R10.11 RIGHT UPPER QUADRANT PAIN: ICD-10-CM

## 2021-10-01 PROCEDURE — 71100 X-RAY EXAM RIBS UNI 2 VIEWS: CPT | Mod: 26

## 2021-10-21 ENCOUNTER — APPOINTMENT (OUTPATIENT)
Dept: INTERNAL MEDICINE | Facility: CLINIC | Age: 31
End: 2021-10-21
Payer: COMMERCIAL

## 2021-10-21 VITALS
BODY MASS INDEX: 21.97 KG/M2 | DIASTOLIC BLOOD PRESSURE: 72 MMHG | OXYGEN SATURATION: 97 % | HEART RATE: 68 BPM | TEMPERATURE: 99.4 F | SYSTOLIC BLOOD PRESSURE: 122 MMHG | WEIGHT: 124 LBS

## 2021-10-21 DIAGNOSIS — R10.11 RIGHT UPPER QUADRANT PAIN: ICD-10-CM

## 2021-10-21 DIAGNOSIS — E55.9 VITAMIN D DEFICIENCY, UNSPECIFIED: ICD-10-CM

## 2021-10-21 DIAGNOSIS — M41.9 SCOLIOSIS, UNSPECIFIED: ICD-10-CM

## 2021-10-21 PROCEDURE — 99213 OFFICE O/P EST LOW 20 MIN: CPT

## 2021-10-21 NOTE — ASSESSMENT
[FreeTextEntry1] : 31F PMH GERD, hyperhydrosis, scoliosis who presents for follow-up\par \par # RUQ pain: Normal x-rays with exception of scoliosis, LFTs and labs have been normal. No associated symptoms or inciting factors appear notable besides for her scoliosis and potentially her vitamin D ingestion. Discussed possibility of her scoliosis playing a role and potentially affecting structure/ligaments/capsule in liver area and other potential impacts. Patient's symptoms themselves have been tolerable, but cause is unclear. \par - US Abdomen RUQ offered and deferred for now after discussion. Low suspicion for GB disease or functional liver disease, possible compression of hepatic ligaments and capsule but US would not necessarily provide diagnosis. May reconsider if worsens.\par - Discussed follow-up on scoliosis treatment\par - Surgical pharmacy for back brace, return to ortho if not successful\par - PT recommended, as one session is unlikely to yield benefit.\par - Monitor

## 2021-10-21 NOTE — PHYSICAL EXAM
[No Acute Distress] : no acute distress [Well-Appearing] : well-appearing [Normal Sclera/Conjunctiva] : normal sclera/conjunctiva [EOMI] : extraocular movements intact [Normal Outer Ear/Nose] : the outer ears and nose were normal in appearance [Normal] : no jugular venous distention, supple, no lymphadenopathy and the thyroid was normal and there were no nodules present [No Respiratory Distress] : no respiratory distress  [No Accessory Muscle Use] : no accessory muscle use [Clear to Auscultation] : lungs were clear to auscultation bilaterally [Normal Rate] : normal rate  [Regular Rhythm] : with a regular rhythm [Normal S1, S2] : normal S1 and S2 [No Edema] : there was no peripheral edema [Soft] : abdomen soft [Non Tender] : non-tender [No HSM] : no HSM [No CVA Tenderness] : no CVA  tenderness [No Spinal Tenderness] : no spinal tenderness [No Joint Swelling] : no joint swelling [No Rash] : no rash [Coordination Grossly Intact] : coordination grossly intact [No Focal Deficits] : no focal deficits [Normal Gait] : normal gait [Normal Affect] : the affect was normal [Normal Mood] : the mood was normal

## 2021-10-21 NOTE — HISTORY OF PRESENT ILLNESS
[de-identified] : 31F PMH GERD, hyperhydrosis, scoliosis who presents for follow-up\par \par She notes a pain under her R rib since March 2020. She describes it more as a discomfort that comes and goes but has not noted any specific exacerbating factors or activities. No pleuritic pain. No temporal relationship with food intake. No diarrhea, constipation, N/V. It seems to have subsided over the past 1.5 weeks when she stopped taking her vitamin D. No specific cause or initial inciting event. Has not improved, stable, tolerable. She had an x-ray last month that was normal.\par \par Regarding her scoliosis, she was prescribed PT and a back brace. She was not able to find a back brace at the pharmacies she went to. She had one PT session but did not continue.

## 2021-10-21 NOTE — REVIEW OF SYSTEMS
Heme/Onc [Nausea] : no nausea [Constipation] : no constipation [Diarrhea] : diarrhea [Vomiting] : no vomiting [Melena] : no melena [Negative] : Heme/Lymph [FreeTextEntry7] : RULIONEL discomfort [FreeTextEntry9] : scoliosis

## 2021-12-22 ENCOUNTER — RESULT REVIEW (OUTPATIENT)
Age: 31
End: 2021-12-22

## 2021-12-31 ENCOUNTER — RESULT REVIEW (OUTPATIENT)
Age: 31
End: 2021-12-31

## 2021-12-31 ENCOUNTER — OUTPATIENT (OUTPATIENT)
Dept: OUTPATIENT SERVICES | Facility: HOSPITAL | Age: 31
LOS: 1 days | End: 2021-12-31
Payer: COMMERCIAL

## 2021-12-31 ENCOUNTER — APPOINTMENT (OUTPATIENT)
Dept: ULTRASOUND IMAGING | Facility: IMAGING CENTER | Age: 31
End: 2021-12-31
Payer: COMMERCIAL

## 2021-12-31 DIAGNOSIS — R10.11 RIGHT UPPER QUADRANT PAIN: ICD-10-CM

## 2021-12-31 PROCEDURE — 76705 ECHO EXAM OF ABDOMEN: CPT

## 2021-12-31 PROCEDURE — 76705 ECHO EXAM OF ABDOMEN: CPT | Mod: 26

## 2021-12-31 PROCEDURE — 71100 X-RAY EXAM RIBS UNI 2 VIEWS: CPT

## 2022-01-07 ENCOUNTER — NON-APPOINTMENT (OUTPATIENT)
Age: 32
End: 2022-01-07

## 2022-03-21 ENCOUNTER — APPOINTMENT (OUTPATIENT)
Dept: DERMATOLOGY | Facility: CLINIC | Age: 32
End: 2022-03-21
Payer: COMMERCIAL

## 2022-03-21 VITALS — BODY MASS INDEX: 21.97 KG/M2 | HEIGHT: 63 IN | WEIGHT: 124 LBS

## 2022-03-21 DIAGNOSIS — L81.0 POSTINFLAMMATORY HYPERPIGMENTATION: ICD-10-CM

## 2022-03-21 DIAGNOSIS — L66.9 CICATRICIAL ALOPECIA, UNSPECIFIED: ICD-10-CM

## 2022-03-21 DIAGNOSIS — L70.0 ACNE VULGARIS: ICD-10-CM

## 2022-03-21 PROCEDURE — 99214 OFFICE O/P EST MOD 30 MIN: CPT

## 2022-03-22 PROBLEM — L66.9 SCARRING ALOPECIA: Status: ACTIVE | Noted: 2020-02-05

## 2022-03-22 PROBLEM — L81.0 POST-INFLAMMATORY HYPERPIGMENTATION: Status: ACTIVE | Noted: 2022-03-21

## 2022-04-11 PROBLEM — Z11.59 SCREENING FOR VIRAL DISEASE: Status: ACTIVE | Noted: 2020-09-21

## 2022-07-05 ENCOUNTER — APPOINTMENT (OUTPATIENT)
Dept: INTERNAL MEDICINE | Facility: CLINIC | Age: 32
End: 2022-07-05

## 2022-07-07 ENCOUNTER — NON-APPOINTMENT (OUTPATIENT)
Age: 32
End: 2022-07-07

## 2022-07-14 ENCOUNTER — APPOINTMENT (OUTPATIENT)
Dept: INTERNAL MEDICINE | Facility: CLINIC | Age: 32
End: 2022-07-14

## 2022-07-14 VITALS
DIASTOLIC BLOOD PRESSURE: 79 MMHG | WEIGHT: 128 LBS | HEIGHT: 63 IN | BODY MASS INDEX: 22.68 KG/M2 | OXYGEN SATURATION: 95 % | HEART RATE: 97 BPM | TEMPERATURE: 98.3 F | SYSTOLIC BLOOD PRESSURE: 114 MMHG

## 2022-07-14 PROCEDURE — 99395 PREV VISIT EST AGE 18-39: CPT

## 2022-07-14 RX ORDER — OMEPRAZOLE 40 MG/1
40 CAPSULE, DELAYED RELEASE ORAL
Qty: 30 | Refills: 1 | Status: DISCONTINUED | COMMUNITY
Start: 2021-05-25 | End: 2022-07-14

## 2022-07-14 RX ORDER — IBUPROFEN 400 MG/1
400 TABLET, FILM COATED ORAL
Qty: 10 | Refills: 0 | Status: DISCONTINUED | COMMUNITY
Start: 2020-04-30 | End: 2022-07-14

## 2022-07-14 NOTE — HEALTH RISK ASSESSMENT
[Good] : ~his/her~  mood as  good [Never] : Never [Yes] : Yes [Monthly or less (1 pt)] : Monthly or less (1 point) [0] : 2) Feeling down, depressed, or hopeless: Not at all (0) [de-identified] : walks daily - 30 minutes [de-identified] : REGULAR  [Patient reported PAP Smear was normal] : Patient reported PAP Smear was normal [Change in mental status noted] : No change in mental status noted [None] : None [Alone] : lives alone [Single] : single [Sexually Active] : not sexually active [Feels Safe at Home] : Feels safe at home [Reports changes in hearing] : Reports no changes in hearing [Reports changes in vision] : Reports no changes in vision [Reports changes in dental health] : Reports no changes in dental health [Smoke Detector] : smoke detector [Carbon Monoxide Detector] : carbon monoxide detector [Safety elements used in home] : safety elements used in home [Seat Belt] :  uses seat belt [PapSmearDate] : 2021 [PapSmearComments] : OUTSIDE

## 2022-07-14 NOTE — ASSESSMENT
[FreeTextEntry1] : 1) CPE\par \par - diet / exercise discussed \par - check labs\par - PAP / TD- utd \par \par

## 2022-07-15 LAB
ALBUMIN SERPL ELPH-MCNC: 4.7 G/DL
ALP BLD-CCNC: 53 U/L
ALT SERPL-CCNC: 12 U/L
ANION GAP SERPL CALC-SCNC: 12 MMOL/L
AST SERPL-CCNC: 16 U/L
BASOPHILS # BLD AUTO: 0.01 K/UL
BASOPHILS NFR BLD AUTO: 0.3 %
BILIRUB SERPL-MCNC: 0.2 MG/DL
BUN SERPL-MCNC: 16 MG/DL
CALCIUM SERPL-MCNC: 9.8 MG/DL
CHLORIDE SERPL-SCNC: 104 MMOL/L
CHOLEST SERPL-MCNC: 149 MG/DL
CO2 SERPL-SCNC: 23 MMOL/L
CREAT SERPL-MCNC: 0.81 MG/DL
EGFR: 99 ML/MIN/1.73M2
EOSINOPHIL # BLD AUTO: 0.01 K/UL
EOSINOPHIL NFR BLD AUTO: 0.3 %
ESTIMATED AVERAGE GLUCOSE: 123 MG/DL
GLUCOSE SERPL-MCNC: 85 MG/DL
HBA1C MFR BLD HPLC: 5.9 %
HCT VFR BLD CALC: 38.3 %
HDLC SERPL-MCNC: 66 MG/DL
HGB BLD-MCNC: 12.9 G/DL
IMM GRANULOCYTES NFR BLD AUTO: 0.3 %
LDLC SERPL CALC-MCNC: 78 MG/DL
LYMPHOCYTES # BLD AUTO: 1.83 K/UL
LYMPHOCYTES NFR BLD AUTO: 50.4 %
MAN DIFF?: NORMAL
MCHC RBC-ENTMCNC: 31 PG
MCHC RBC-ENTMCNC: 33.7 GM/DL
MCV RBC AUTO: 92.1 FL
MONOCYTES # BLD AUTO: 0.29 K/UL
MONOCYTES NFR BLD AUTO: 8 %
NEUTROPHILS # BLD AUTO: 1.48 K/UL
NEUTROPHILS NFR BLD AUTO: 40.7 %
NONHDLC SERPL-MCNC: 83 MG/DL
PLATELET # BLD AUTO: 205 K/UL
POTASSIUM SERPL-SCNC: 4.1 MMOL/L
PROT SERPL-MCNC: 7.9 G/DL
RBC # BLD: 4.16 M/UL
RBC # FLD: 12.2 %
SODIUM SERPL-SCNC: 139 MMOL/L
TRIGL SERPL-MCNC: 24 MG/DL
TSH SERPL-ACNC: 0.71 UIU/ML
WBC # FLD AUTO: 3.63 K/UL

## 2022-07-27 ENCOUNTER — NON-APPOINTMENT (OUTPATIENT)
Age: 32
End: 2022-07-27

## 2022-12-15 ENCOUNTER — APPOINTMENT (OUTPATIENT)
Dept: INTERNAL MEDICINE | Facility: CLINIC | Age: 32
End: 2022-12-15

## 2022-12-15 ENCOUNTER — TRANSCRIPTION ENCOUNTER (OUTPATIENT)
Age: 32
End: 2022-12-15

## 2022-12-15 DIAGNOSIS — J01.90 ACUTE SINUSITIS, UNSPECIFIED: ICD-10-CM

## 2022-12-15 DIAGNOSIS — J06.9 ACUTE UPPER RESPIRATORY INFECTION, UNSPECIFIED: ICD-10-CM

## 2022-12-15 PROCEDURE — 99213 OFFICE O/P EST LOW 20 MIN: CPT | Mod: 95

## 2022-12-15 NOTE — REVIEW OF SYSTEMS
[Fatigue] : fatigue [Nasal Discharge] : nasal discharge [Cough] : cough [Negative] : Heme/Lymph [Fever] : no fever [Chills] : no chills [Earache] : no earache [Wheezing] : no wheezing

## 2022-12-15 NOTE — HISTORY OF PRESENT ILLNESS
[FreeTextEntry8] : 32F PMH GERD who presents via telehealth with complaint of cough/cold symptoms\par \par On Tuesday she went out for her morning walk, it was very cold, when she came inside she felt like she was starting to get sick, in her head and her chest. Didn't have a hat or casas.\par She's had no fevers or chills, but does note a Tmax of 99.1 F\par She feels lots of congestion in her head and pressure when she coughs. \par No loss of sense of smell, myalgias. Slight cough, sore throat but resolving, no noted LN swelling.\par She has bee eating chicken soup and hydrating with water.\par Took 2 home COVID-19 tests, both negative

## 2022-12-15 NOTE — ASSESSMENT
[FreeTextEntry1] : 32F PMH GERD who presents via telehealth with complaint of cough/cold symptoms\par \par # Viral upper respiratory infection: She is on day 3 of a viral URI with nasal congestion. She likely has viral sinusitis given head pressure. The time course makes bacterial infection highly unlikely at this time but she is at high risk of proceeding to bacterial infection. \par - Continue with hot fluids, hot showers, steam tx etc\par - Mucinex\par - nasal saline and flonase, maintain patency of nasal passages

## 2022-12-19 ENCOUNTER — APPOINTMENT (OUTPATIENT)
Dept: INTERNAL MEDICINE | Facility: CLINIC | Age: 32
End: 2022-12-19

## 2022-12-19 VITALS
TEMPERATURE: 98 F | DIASTOLIC BLOOD PRESSURE: 81 MMHG | WEIGHT: 129 LBS | OXYGEN SATURATION: 95 % | HEIGHT: 63 IN | HEART RATE: 110 BPM | BODY MASS INDEX: 22.86 KG/M2 | SYSTOLIC BLOOD PRESSURE: 122 MMHG

## 2022-12-19 PROCEDURE — 99214 OFFICE O/P EST MOD 30 MIN: CPT

## 2022-12-19 NOTE — ASSESSMENT
[FreeTextEntry1] : 33 y/o female with new bowel changes in recent times, with blood and mucus in stool. NO acute infectious symptoms or recent travel.  Needs to be evaluated for IBD, colitis, etc.  No significant blood loss, otherwise stable.  Referred to GI for consideration of colonoscopy and other diagnostics.

## 2022-12-19 NOTE — HISTORY OF PRESENT ILLNESS
[FreeTextEntry8] : 33 y/o female for APA - blood in stool.\par \par Of note, had telehealth appt with Dr. Gonzalez 4 days ago for viral syndrome\par \par Has a hx of constipation since childhood\par would occ have a bit of bleeding on the tissue\par In the past few months has noted occ streaks on blood on the stool, which is a change\par yesterday had a blood streaked bowel movement with some mucous, as well as some tissue like material (has pictures)\par No belly pain no weight loss\par No hx of Intestinal illness in her family.  \par No recent travel.

## 2022-12-19 NOTE — PHYSICAL EXAM
[No Acute Distress] : no acute distress [Well-Appearing] : well-appearing [Normal Sclera/Conjunctiva] : normal sclera/conjunctiva [Soft] : abdomen soft [Non Tender] : non-tender

## 2023-01-18 ENCOUNTER — APPOINTMENT (OUTPATIENT)
Dept: INTERNAL MEDICINE | Facility: CLINIC | Age: 33
End: 2023-01-18
Payer: COMMERCIAL

## 2023-01-18 VITALS
OXYGEN SATURATION: 98 % | WEIGHT: 130 LBS | TEMPERATURE: 98.6 F | HEART RATE: 77 BPM | HEIGHT: 63 IN | BODY MASS INDEX: 23.04 KG/M2 | SYSTOLIC BLOOD PRESSURE: 114 MMHG | DIASTOLIC BLOOD PRESSURE: 76 MMHG

## 2023-01-18 DIAGNOSIS — N94.6 DYSMENORRHEA, UNSPECIFIED: ICD-10-CM

## 2023-01-18 PROCEDURE — 99213 OFFICE O/P EST LOW 20 MIN: CPT

## 2023-01-18 NOTE — PHYSICAL EXAM
[No Acute Distress] : no acute distress [Well-Appearing] : well-appearing [Normal Voice/Communication] : normal voice/communication [No Edema] : there was no peripheral edema [Normal] : soft, non-tender, non-distended, no masses palpated, no HSM and normal bowel sounds

## 2023-01-18 NOTE — HISTORY OF PRESENT ILLNESS
[FreeTextEntry8] : 32 y.o. female, PMHx GERD, constipation, acne.  \dona Called out sick from work this past Monday d/t menses.  Gets severe cramps, sometimes causes nausea, vomiting, diarrhea as well.  Does not have heavy bleeding.  Severe symptoms tend to last 1 day.  Takes Advil with modest relief.  \par Also with PMDD symptoms - irritability, low mood, fatigue.  \dona Has been this way since periods started at age 9.  \dona Has mentioned symptoms to her gynecologists in the past, but no treatment recommended.  \dona Has never been on OCPs, no h/o pregnancy.  Not currently sexually active.  \dona

## 2023-01-18 NOTE — ASSESSMENT
[FreeTextEntry1] : PMDD, dysmenorrhea:\par Discussed hormonal birth control as preferred option for combination of symptoms.  No contraindications - no personal or family h/o cancer, no blood clots, not hypertensive, dose not smoke.   Declines at this time.  \par Also discussed SSRI for PMDD symptoms. \par Naproxen, heating pad for menorrhagia.  \par Also suggest patient look into FMLA intermittent leave for days she needs to be out of work d/t symptoms, which will be 1-2x in a month as she reports regular menstrual cycle.  \par Also suggest transvaginal US to assess for anatomical cause for symptoms.  \par Patient will do her own research and consider options as well as further testing.  \par Note provided for missed work 2 days ago.  \par

## 2023-01-25 ENCOUNTER — APPOINTMENT (OUTPATIENT)
Dept: GASTROENTEROLOGY | Facility: CLINIC | Age: 33
End: 2023-01-25
Payer: COMMERCIAL

## 2023-01-25 VITALS
SYSTOLIC BLOOD PRESSURE: 113 MMHG | BODY MASS INDEX: 22.32 KG/M2 | HEART RATE: 96 BPM | HEIGHT: 63 IN | DIASTOLIC BLOOD PRESSURE: 69 MMHG | WEIGHT: 126 LBS

## 2023-01-25 DIAGNOSIS — K59.00 CONSTIPATION, UNSPECIFIED: ICD-10-CM

## 2023-01-25 PROCEDURE — 82272 OCCULT BLD FECES 1-3 TESTS: CPT

## 2023-01-25 PROCEDURE — 99243 OFF/OP CNSLTJ NEW/EST LOW 30: CPT | Mod: 25

## 2023-01-25 RX ORDER — ALUMINUM CHLORIDE 20 %
20 SOLUTION, NON-ORAL TOPICAL
Qty: 1 | Refills: 5 | Status: DISCONTINUED | COMMUNITY
Start: 2021-03-22 | End: 2023-01-25

## 2023-01-25 RX ORDER — FLUOCINOLONE ACETONIDE 0.1 MG/ML
0.01 SOLUTION TOPICAL
Qty: 1 | Refills: 1 | Status: DISCONTINUED | COMMUNITY
Start: 2020-02-05 | End: 2023-01-25

## 2023-01-25 RX ORDER — BENZOYL PEROXIDE 5 G/100G
5 LIQUID TOPICAL DAILY
Qty: 1 | Refills: 6 | Status: DISCONTINUED | COMMUNITY
Start: 2020-12-21 | End: 2023-01-25

## 2023-01-25 NOTE — HISTORY OF PRESENT ILLNESS
[FreeTextEntry1] : Lorin has been chronically constipated (she used to average 1 BM weekly).  She presents with rectal bleeding.  From time to time, she has seen some blood per rectum, but lately, she has been experiencing more painless hematochezia.  She sees blood admixed with the stool, sometimes a red streak on the outside of the stool, sometimes maroon blood (cell phone photos were shown to me).  She denies abdominal pain, anorectal discomfort, hard stool, or straining to defecate.  She also reports reflux, especially if she is lying down when eating.  She might take 2 Advil around the time of her menses.  Family history is negative for colorectal neoplasia.  She has never undergone colonoscopy.

## 2023-01-25 NOTE — ASSESSMENT
[FreeTextEntry1] : 1.  Painless hematochezia statistically likely from internal hemorrhoids.  Rule out colitis, colorectal neoplasia.\par 2.  Chronic constipation.\par 3.  History of GERD.\par 4.  Prediabetes.\par 5.  Status post 2 wisdom teeth extractions.\par 6.  Allergic to animal dander, grass, mold.\par \par Plan:\par 1.  Medical records have been reviewed.\par 2.  Dietary instruction given.\par 3.  Consider lower endoscopy--Procedure, rationale, alternatives, material risks, anesthesia plan, and PEG prep instructions were reviewed and brochure given.  Although she was advised that internal hemorrhoids are the likely culprit, she is aware of the possibility of polyp/cancer.\par

## 2023-01-25 NOTE — REASON FOR VISIT
[Consultation] : a consultation visit [Other: _____] : [unfilled] [FreeTextEntry1] : Blood in stool, constipation

## 2023-01-25 NOTE — CONSULT LETTER
[Dear  ___] : Dear  [unfilled], [Consult Letter:] : I had the pleasure of evaluating your patient, [unfilled]. [Please see my note below.] : Please see my note below. [Consult Closing:] : Thank you very much for allowing me to participate in the care of this patient.  If you have any questions, please do not hesitate to contact me. [Sincerely,] : Sincerely, [FreeTextEntry3] : Corby Russ M.D.\par

## 2023-01-25 NOTE — REVIEW OF SYSTEMS
[Constipation] : constipation [Arthralgias (joint pain)] : arthralgias [Negative] : Heme/Lymph [As Noted in HPI] : as noted in HPI [Heartburn] : heartburn [Bleeding] : bleeding

## 2023-01-25 NOTE — PHYSICAL EXAM
[No Acute Distress] : no acute distress [Well Developed] : well developed [Well Nourished] : well nourished [Sclera] : the sclera and conjunctiva were normal [None] : no edema [No External Hemorrhoid] : no external hemorrhoids [No Rectal Mass] : no rectal mass [Inguinal Lymph Nodes Enlarged Bilaterally] : no inguinal lymphadenopathy [Normal Color / Pigmentation] : normal skin color and pigmentation [Normal] : oriented to person, place, and time [Occult Blood] : negative occult blood

## 2023-09-19 ENCOUNTER — APPOINTMENT (OUTPATIENT)
Dept: INTERNAL MEDICINE | Facility: CLINIC | Age: 33
End: 2023-09-19
Payer: COMMERCIAL

## 2023-09-19 VITALS
WEIGHT: 127 LBS | DIASTOLIC BLOOD PRESSURE: 88 MMHG | SYSTOLIC BLOOD PRESSURE: 117 MMHG | RESPIRATION RATE: 15 BRPM | OXYGEN SATURATION: 98 % | HEIGHT: 63 IN | BODY MASS INDEX: 22.5 KG/M2 | HEART RATE: 81 BPM | TEMPERATURE: 98 F

## 2023-09-19 DIAGNOSIS — K92.1 MELENA: ICD-10-CM

## 2023-09-19 DIAGNOSIS — Z00.00 ENCOUNTER FOR GENERAL ADULT MEDICAL EXAMINATION W/OUT ABNORMAL FINDINGS: ICD-10-CM

## 2023-09-19 PROCEDURE — 99395 PREV VISIT EST AGE 18-39: CPT

## 2023-09-19 RX ORDER — GUAIFENESIN 400 MG/1
400 TABLET ORAL EVERY 4 HOURS
Qty: 1 | Refills: 0 | Status: DISCONTINUED | COMMUNITY
Start: 2022-12-15 | End: 2023-09-19

## 2023-09-19 RX ORDER — CETIRIZINE HYDROCHLORIDE 10 MG/1
10 CAPSULE, LIQUID FILLED ORAL
Qty: 30 | Refills: 3 | Status: DISCONTINUED | COMMUNITY
Start: 2020-01-21 | End: 2023-09-19

## 2023-09-19 RX ORDER — CLINDAMYCIN PHOSPHATE 10 MG/ML
1 LOTION TOPICAL
Qty: 1 | Refills: 6 | Status: DISCONTINUED | COMMUNITY
Start: 2020-12-21 | End: 2023-09-19

## 2023-09-20 LAB
ALBUMIN SERPL ELPH-MCNC: 4.7 G/DL
ALP BLD-CCNC: 52 U/L
ALT SERPL-CCNC: 10 U/L
ANION GAP SERPL CALC-SCNC: 14 MMOL/L
AST SERPL-CCNC: 20 U/L
BILIRUB SERPL-MCNC: 0.2 MG/DL
BUN SERPL-MCNC: 17 MG/DL
CALCIUM SERPL-MCNC: 9.4 MG/DL
CHLORIDE SERPL-SCNC: 101 MMOL/L
CHOLEST SERPL-MCNC: 145 MG/DL
CO2 SERPL-SCNC: 22 MMOL/L
CREAT SERPL-MCNC: 0.69 MG/DL
EGFR: 117 ML/MIN/1.73M2
ESTIMATED AVERAGE GLUCOSE: 123 MG/DL
GLUCOSE SERPL-MCNC: 77 MG/DL
HBA1C MFR BLD HPLC: 5.9 %
HCT VFR BLD CALC: 40.8 %
HDLC SERPL-MCNC: 63 MG/DL
HGB BLD-MCNC: 13.3 G/DL
LDLC SERPL CALC-MCNC: 73 MG/DL
MCHC RBC-ENTMCNC: 30.9 PG
MCHC RBC-ENTMCNC: 32.6 GM/DL
MCV RBC AUTO: 94.7 FL
NONHDLC SERPL-MCNC: 82 MG/DL
PLATELET # BLD AUTO: 221 K/UL
POTASSIUM SERPL-SCNC: 4.5 MMOL/L
PROT SERPL-MCNC: 8 G/DL
RBC # BLD: 4.31 M/UL
RBC # FLD: 12.2 %
SODIUM SERPL-SCNC: 137 MMOL/L
TRIGL SERPL-MCNC: 38 MG/DL
TSH SERPL-ACNC: 1.08 UIU/ML
WBC # FLD AUTO: 5.1 K/UL

## 2023-12-11 ENCOUNTER — APPOINTMENT (OUTPATIENT)
Dept: INTERNAL MEDICINE | Facility: CLINIC | Age: 33
End: 2023-12-11
Payer: COMMERCIAL

## 2023-12-11 ENCOUNTER — NON-APPOINTMENT (OUTPATIENT)
Age: 33
End: 2023-12-11

## 2023-12-11 VITALS
SYSTOLIC BLOOD PRESSURE: 120 MMHG | OXYGEN SATURATION: 98 % | WEIGHT: 123 LBS | HEIGHT: 63 IN | RESPIRATION RATE: 14 BRPM | HEART RATE: 82 BPM | TEMPERATURE: 98 F | DIASTOLIC BLOOD PRESSURE: 80 MMHG | BODY MASS INDEX: 21.79 KG/M2

## 2023-12-11 DIAGNOSIS — R53.83 OTHER MALAISE: ICD-10-CM

## 2023-12-11 DIAGNOSIS — R42 DIZZINESS AND GIDDINESS: ICD-10-CM

## 2023-12-11 DIAGNOSIS — R53.81 OTHER MALAISE: ICD-10-CM

## 2023-12-11 PROCEDURE — 36415 COLL VENOUS BLD VENIPUNCTURE: CPT

## 2023-12-11 PROCEDURE — 99213 OFFICE O/P EST LOW 20 MIN: CPT | Mod: 25

## 2023-12-12 LAB
ALBUMIN SERPL ELPH-MCNC: 4.5 G/DL
ALP BLD-CCNC: 54 U/L
ALT SERPL-CCNC: 11 U/L
ANION GAP SERPL CALC-SCNC: 11 MMOL/L
AST SERPL-CCNC: 15 U/L
BASOPHILS # BLD AUTO: 0.01 K/UL
BASOPHILS NFR BLD AUTO: 0.2 %
BILIRUB SERPL-MCNC: 0.3 MG/DL
BUN SERPL-MCNC: 14 MG/DL
CALCIUM SERPL-MCNC: 9.5 MG/DL
CHLORIDE SERPL-SCNC: 101 MMOL/L
CO2 SERPL-SCNC: 26 MMOL/L
CREAT SERPL-MCNC: 0.67 MG/DL
EGFR: 118 ML/MIN/1.73M2
EOSINOPHIL # BLD AUTO: 0.03 K/UL
EOSINOPHIL NFR BLD AUTO: 0.7 %
FERRITIN SERPL-MCNC: 126 NG/ML
FOLATE SERPL-MCNC: 9.9 NG/ML
GLUCOSE SERPL-MCNC: 86 MG/DL
HCG SERPL-MCNC: <1 MIU/ML
HCT VFR BLD CALC: 38.8 %
HGB BLD-MCNC: 12.6 G/DL
IMM GRANULOCYTES NFR BLD AUTO: 0 %
IRON SATN MFR SERPL: 37 %
IRON SERPL-MCNC: 131 UG/DL
LYMPHOCYTES # BLD AUTO: 1.96 K/UL
LYMPHOCYTES NFR BLD AUTO: 48.2 %
MAN DIFF?: NORMAL
MCHC RBC-ENTMCNC: 30.4 PG
MCHC RBC-ENTMCNC: 32.5 GM/DL
MCV RBC AUTO: 93.5 FL
MONOCYTES # BLD AUTO: 0.44 K/UL
MONOCYTES NFR BLD AUTO: 10.8 %
NEUTROPHILS # BLD AUTO: 1.63 K/UL
NEUTROPHILS NFR BLD AUTO: 40.1 %
PLATELET # BLD AUTO: 288 K/UL
POTASSIUM SERPL-SCNC: 4 MMOL/L
PROT SERPL-MCNC: 7.6 G/DL
RBC # BLD: 4.15 M/UL
RBC # FLD: 11.5 %
SODIUM SERPL-SCNC: 138 MMOL/L
TIBC SERPL-MCNC: 351 UG/DL
TSH SERPL-ACNC: 0.96 UIU/ML
UIBC SERPL-MCNC: 220 UG/DL
VIT B12 SERPL-MCNC: 818 PG/ML
WBC # FLD AUTO: 4.07 K/UL

## 2023-12-27 ENCOUNTER — APPOINTMENT (OUTPATIENT)
Dept: INTERNAL MEDICINE | Facility: CLINIC | Age: 33
End: 2023-12-27
Payer: COMMERCIAL

## 2023-12-27 VITALS
DIASTOLIC BLOOD PRESSURE: 90 MMHG | BODY MASS INDEX: 22.14 KG/M2 | OXYGEN SATURATION: 92 % | SYSTOLIC BLOOD PRESSURE: 132 MMHG | HEART RATE: 102 BPM | WEIGHT: 125 LBS | RESPIRATION RATE: 15 BRPM

## 2023-12-27 DIAGNOSIS — R53.83 OTHER FATIGUE: ICD-10-CM

## 2023-12-27 PROCEDURE — 99213 OFFICE O/P EST LOW 20 MIN: CPT

## 2023-12-27 NOTE — ASSESSMENT
[FreeTextEntry1] : 1) Fatigue  - post viral  - labs discussed  - adv to slowly start exercise again

## 2023-12-27 NOTE — HISTORY OF PRESENT ILLNESS
[FreeTextEntry1] : here for f/u after Covid infection  reports that she has not having any cough or SOB  ct to have fatigue  labs reviewed

## 2024-08-10 ENCOUNTER — NON-APPOINTMENT (OUTPATIENT)
Age: 34
End: 2024-08-10

## 2024-10-10 ENCOUNTER — APPOINTMENT (OUTPATIENT)
Dept: INTERNAL MEDICINE | Facility: CLINIC | Age: 34
End: 2024-10-10
Payer: COMMERCIAL

## 2024-10-10 VITALS
WEIGHT: 116 LBS | HEART RATE: 91 BPM | BODY MASS INDEX: 20.55 KG/M2 | HEIGHT: 63 IN | TEMPERATURE: 98.1 F | SYSTOLIC BLOOD PRESSURE: 150 MMHG | DIASTOLIC BLOOD PRESSURE: 96 MMHG | OXYGEN SATURATION: 98 %

## 2024-10-10 DIAGNOSIS — I10 ESSENTIAL (PRIMARY) HYPERTENSION: ICD-10-CM

## 2024-10-10 DIAGNOSIS — Z00.00 ENCOUNTER FOR GENERAL ADULT MEDICAL EXAMINATION W/OUT ABNORMAL FINDINGS: ICD-10-CM

## 2024-10-10 PROCEDURE — 36415 COLL VENOUS BLD VENIPUNCTURE: CPT

## 2024-10-10 PROCEDURE — 99395 PREV VISIT EST AGE 18-39: CPT

## 2024-10-11 DIAGNOSIS — D72.819 DECREASED WHITE BLOOD CELL COUNT, UNSPECIFIED: ICD-10-CM

## 2024-10-11 LAB
ALBUMIN SERPL ELPH-MCNC: 4.5 G/DL
ALP BLD-CCNC: 58 U/L
ALT SERPL-CCNC: 10 U/L
ANION GAP SERPL CALC-SCNC: 13 MMOL/L
AST SERPL-CCNC: 17 U/L
BILIRUB SERPL-MCNC: 0.3 MG/DL
BUN SERPL-MCNC: 12 MG/DL
CALCIUM SERPL-MCNC: 9.7 MG/DL
CHLORIDE SERPL-SCNC: 101 MMOL/L
CHOLEST SERPL-MCNC: 153 MG/DL
CO2 SERPL-SCNC: 24 MMOL/L
CREAT SERPL-MCNC: 0.67 MG/DL
EGFR: 118 ML/MIN/1.73M2
ESTIMATED AVERAGE GLUCOSE: 114 MG/DL
GLUCOSE SERPL-MCNC: 83 MG/DL
HBA1C MFR BLD HPLC: 5.6 %
HCT VFR BLD CALC: 40.7 %
HDLC SERPL-MCNC: 70 MG/DL
HGB BLD-MCNC: 13.5 G/DL
HIV1+2 AB SPEC QL IA.RAPID: NONREACTIVE
LDLC SERPL CALC-MCNC: 76 MG/DL
MCHC RBC-ENTMCNC: 31.1 PG
MCHC RBC-ENTMCNC: 33.2 GM/DL
MCV RBC AUTO: 93.8 FL
NONHDLC SERPL-MCNC: 83 MG/DL
PLATELET # BLD AUTO: 209 K/UL
POTASSIUM SERPL-SCNC: 3.9 MMOL/L
PROT SERPL-MCNC: 7.9 G/DL
RBC # BLD: 4.34 M/UL
RBC # FLD: 11.9 %
SODIUM SERPL-SCNC: 137 MMOL/L
T PALLIDUM AB SER QL IA: NEGATIVE
TRIGL SERPL-MCNC: 23 MG/DL
TSH SERPL-ACNC: 0.48 UIU/ML
WBC # FLD AUTO: 3.32 K/UL

## 2024-11-13 ENCOUNTER — NON-APPOINTMENT (OUTPATIENT)
Age: 34
End: 2024-11-13

## 2024-12-11 DIAGNOSIS — D72.819 DECREASED WHITE BLOOD CELL COUNT, UNSPECIFIED: ICD-10-CM

## 2024-12-12 ENCOUNTER — TRANSCRIPTION ENCOUNTER (OUTPATIENT)
Age: 34
End: 2024-12-12

## 2024-12-31 ENCOUNTER — NON-APPOINTMENT (OUTPATIENT)
Age: 34
End: 2024-12-31

## 2024-12-31 ENCOUNTER — APPOINTMENT (OUTPATIENT)
Dept: INTERNAL MEDICINE | Facility: CLINIC | Age: 34
End: 2024-12-31

## 2025-01-16 ENCOUNTER — APPOINTMENT (OUTPATIENT)
Dept: INTERNAL MEDICINE | Facility: CLINIC | Age: 35
End: 2025-01-16

## 2025-04-02 ENCOUNTER — APPOINTMENT (OUTPATIENT)
Dept: INTERNAL MEDICINE | Facility: CLINIC | Age: 35
End: 2025-04-02
Payer: COMMERCIAL

## 2025-04-02 VITALS
TEMPERATURE: 98 F | WEIGHT: 123 LBS | DIASTOLIC BLOOD PRESSURE: 92 MMHG | HEART RATE: 86 BPM | SYSTOLIC BLOOD PRESSURE: 140 MMHG | BODY MASS INDEX: 21.79 KG/M2 | OXYGEN SATURATION: 99 %

## 2025-04-02 VITALS — DIASTOLIC BLOOD PRESSURE: 87 MMHG | SYSTOLIC BLOOD PRESSURE: 135 MMHG

## 2025-04-02 DIAGNOSIS — D72.819 DECREASED WHITE BLOOD CELL COUNT, UNSPECIFIED: ICD-10-CM

## 2025-04-02 PROCEDURE — 36415 COLL VENOUS BLD VENIPUNCTURE: CPT

## 2025-04-02 PROCEDURE — 99214 OFFICE O/P EST MOD 30 MIN: CPT

## 2025-04-03 DIAGNOSIS — D70.9 NEUTROPENIA, UNSPECIFIED: ICD-10-CM

## 2025-04-03 LAB
BASOPHILS # BLD AUTO: 0.02 K/UL
BASOPHILS NFR BLD AUTO: 0.6 %
EBV DNA SERPL NAA+PROBE-ACNC: NOT DETECTED IU/ML
EBVPCR LOG: NOT DETECTED LOG10IU/ML
EOSINOPHIL # BLD AUTO: 0.01 K/UL
EOSINOPHIL NFR BLD AUTO: 0.3 %
HCT VFR BLD CALC: 42 %
HGB BLD-MCNC: 13.4 G/DL
IMM GRANULOCYTES NFR BLD AUTO: 0 %
LYMPHOCYTES # BLD AUTO: 1.69 K/UL
LYMPHOCYTES NFR BLD AUTO: 48.4 %
MAN DIFF?: NORMAL
MCHC RBC-ENTMCNC: 30.7 PG
MCHC RBC-ENTMCNC: 31.9 G/DL
MCV RBC AUTO: 96.1 FL
MONOCYTES # BLD AUTO: 0.35 K/UL
MONOCYTES NFR BLD AUTO: 10 %
NEUTROPHILS # BLD AUTO: 1.42 K/UL
NEUTROPHILS NFR BLD AUTO: 40.7 %
PLATELET # BLD AUTO: 215 K/UL
RBC # BLD: 4.37 M/UL
RBC # FLD: 12.3 %
WBC # FLD AUTO: 3.49 K/UL

## 2025-04-09 ENCOUNTER — EMERGENCY (EMERGENCY)
Facility: HOSPITAL | Age: 35
LOS: 1 days | End: 2025-04-09
Attending: EMERGENCY MEDICINE | Admitting: EMERGENCY MEDICINE
Payer: COMMERCIAL

## 2025-04-09 VITALS
TEMPERATURE: 98 F | DIASTOLIC BLOOD PRESSURE: 98 MMHG | OXYGEN SATURATION: 98 % | RESPIRATION RATE: 18 BRPM | HEIGHT: 62 IN | WEIGHT: 123.02 LBS | HEART RATE: 93 BPM | SYSTOLIC BLOOD PRESSURE: 148 MMHG

## 2025-04-09 VITALS
HEART RATE: 53 BPM | OXYGEN SATURATION: 98 % | RESPIRATION RATE: 18 BRPM | TEMPERATURE: 98 F | SYSTOLIC BLOOD PRESSURE: 132 MMHG | DIASTOLIC BLOOD PRESSURE: 88 MMHG

## 2025-04-09 LAB
ANION GAP SERPL CALC-SCNC: 12 MMOL/L — SIGNIFICANT CHANGE UP (ref 7–14)
BUN SERPL-MCNC: 18 MG/DL — SIGNIFICANT CHANGE UP (ref 7–23)
CALCIUM SERPL-MCNC: 9 MG/DL — SIGNIFICANT CHANGE UP (ref 8.4–10.5)
CHLORIDE SERPL-SCNC: 105 MMOL/L — SIGNIFICANT CHANGE UP (ref 98–107)
CO2 SERPL-SCNC: 20 MMOL/L — LOW (ref 22–31)
CREAT SERPL-MCNC: 0.68 MG/DL — SIGNIFICANT CHANGE UP (ref 0.5–1.3)
EGFR: 117 ML/MIN/1.73M2 — SIGNIFICANT CHANGE UP
EGFR: 117 ML/MIN/1.73M2 — SIGNIFICANT CHANGE UP
GLUCOSE SERPL-MCNC: 96 MG/DL — SIGNIFICANT CHANGE UP (ref 70–99)
HCT VFR BLD CALC: 38.8 % — SIGNIFICANT CHANGE UP (ref 34.5–45)
HGB BLD-MCNC: 13.1 G/DL — SIGNIFICANT CHANGE UP (ref 11.5–15.5)
MAGNESIUM SERPL-MCNC: 2.2 MG/DL — SIGNIFICANT CHANGE UP (ref 1.6–2.6)
MCHC RBC-ENTMCNC: 31.2 PG — SIGNIFICANT CHANGE UP (ref 27–34)
MCHC RBC-ENTMCNC: 33.8 G/DL — SIGNIFICANT CHANGE UP (ref 32–36)
MCV RBC AUTO: 92.4 FL — SIGNIFICANT CHANGE UP (ref 80–100)
NRBC # BLD AUTO: 0 K/UL — SIGNIFICANT CHANGE UP (ref 0–0)
NRBC # FLD: 0 K/UL — SIGNIFICANT CHANGE UP (ref 0–0)
NRBC BLD AUTO-RTO: 0 /100 WBCS — SIGNIFICANT CHANGE UP (ref 0–0)
PHOSPHATE SERPL-MCNC: 4 MG/DL — SIGNIFICANT CHANGE UP (ref 2.5–4.5)
PLATELET # BLD AUTO: 190 K/UL — SIGNIFICANT CHANGE UP (ref 150–400)
POTASSIUM SERPL-MCNC: 4 MMOL/L — SIGNIFICANT CHANGE UP (ref 3.5–5.3)
POTASSIUM SERPL-SCNC: 4 MMOL/L — SIGNIFICANT CHANGE UP (ref 3.5–5.3)
RBC # BLD: 4.2 M/UL — SIGNIFICANT CHANGE UP (ref 3.8–5.2)
RBC # FLD: 12.2 % — SIGNIFICANT CHANGE UP (ref 10.3–14.5)
SODIUM SERPL-SCNC: 137 MMOL/L — SIGNIFICANT CHANGE UP (ref 135–145)
T4 AB SER-ACNC: 7.41 UG/DL — SIGNIFICANT CHANGE UP (ref 5.1–13)
TSH SERPL-MCNC: 0.69 UIU/ML — SIGNIFICANT CHANGE UP (ref 0.27–4.2)
WBC # BLD: 3.57 K/UL — LOW (ref 3.8–10.5)
WBC # FLD AUTO: 3.57 K/UL — LOW (ref 3.8–10.5)

## 2025-04-09 PROCEDURE — 93010 ELECTROCARDIOGRAM REPORT: CPT

## 2025-04-09 PROCEDURE — 99284 EMERGENCY DEPT VISIT MOD MDM: CPT

## 2025-04-09 NOTE — ED PROVIDER NOTE - ATTENDING CONTRIBUTION TO CARE
I performed a face-to-face evaluation of the patient and performed a history and physical examination. I agree with the history and physical examination. If this was a PA visit, I personally saw the patient with the PA and performed a substantive portion of the visit including all aspects of the medical decision making.    Stands up as food  all day. P/w unprovoked lightheadedness w/o CP/SOB/HA while standing in the line today. No unusual sleep/eat pattern last night. Has high histamine levels, but no allergic Sx today. HR occasionally (sitting or standing) gets to 120 (per FIT-BIT). Check Hb and TSH and EKG. If unremarkable, then dc home.

## 2025-04-09 NOTE — ED PROVIDER NOTE - PATIENT PORTAL LINK FT
You can access the FollowMyHealth Patient Portal offered by [facility] by registering at the following website: [email or web address]. By joining [employer]’s FollowMyHealth portal, you will also be able to view your health information using other applications (apps) compatible with our system.

## 2025-04-09 NOTE — ED PROVIDER NOTE - CLINICAL SUMMARY MEDICAL DECISION MAKING FREE TEXT BOX
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 No unusual sleep/eat pattern last night. Has high histamine levels, but no allergic Sx today. HR occasionally (sitting or standing) gets to 120 (per FIT-BIT). Check Hb and TSH and EKG. If unremarkable, then dc home. 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 No unusual sleep/eat pattern last night. Has high histamine levels, but no allergic Sx today. HR occasionally (sitting or standing) gets to 120 (per FIT-BIT). Check Hb and TSH and EKG. If unremarkable, then dc home.    naila fellow: Patient coming in with sudden onset of dizziness while standing for long period of time this morning.  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  She had unprovoked dizziness described more as lightheadedness though no chest pain shortness of breath fever headaches leg swelling or history of blood clots.  No history of thyroid problems.  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  No current allergic reactions today.  Says that her Fitbit normally gives her heart rate 130-140 while inactive though no changes in the heart rate today except for heart rate in the 120s when she was running to work this morning.  No use of caffeine or energy drinks.  No smoking or use of alcohol.Has history of blood in the stool intermittent for greater than 1 month has appointment with GI specialist tomorrow.  No blood noted today does not check her stools every day though the last time she noticed blood intermixed in stool was 2 days ago.  No rectal pain.  Exam–AO x 4 well-appearing female mildly anxious, S1-S2 present no extra murmurs extra heart sounds normal cardiac, clear lungs throughout, abdomen soft nontender nondistended, no rashes, pulses regular 2+ throughout.  Patient likely has orthostatic hypotension Notes her BP this morning was 90/70, has been standing for a long time, will rule out thyroid disorder electrolyte abnormality EKG is normal. Get hemoglobin to rule out anemia given blood mixed in stool for chronic period of time.

## 2025-04-09 NOTE — ED ADULT NURSE NOTE - OBJECTIVE STATEMENT
A&Ox4. ambulatory. c/o sudden onset of dizziness this morning. NAD. LMP 3/20/25. pt denies SOB, chest pain, dizziness, weakness, urinary symptoms, HA, n/v/d, fevers, chills, pain, syncope, vision changes. respirations are even and unlabored. ABD is soft and nontender. skin intact. 20g placed to LAC. labs drawn and sent. safety precautions maintained.

## 2025-04-09 NOTE — ED ADULT TRIAGE NOTE - CHIEF COMPLAINT QUOTE
c/o sudden onset of dizziness, repots BP was low this morning at home 90/60. PERRLA extremities are strong and equal B/l. denies Phx.

## 2025-04-09 NOTE — ED ADULT NURSE NOTE - PRO INTERPRETER NEED 2
Addended by: ANNEMARIE COUGHLIN on: 7/11/2019 09:15 AM     Modules accepted: Level of Service     English

## 2025-04-09 NOTE — ED PROVIDER NOTE - NSFOLLOWUPINSTRUCTIONS_ED_ALL_ED_FT
You are seen today for dizziness described more as lightheadedness.  Is likely because she is standing for long periods of time.  Please try to take breaks during work to move around or to sit down for few minutes.  Your lab work was normal.  Do please follow-up with your gastroenterologist appointment for the blood in stool that you get at times.   If your symptoms change or worsen please return to your closest emergency department.

## 2025-04-09 NOTE — ED ADULT NURSE NOTE - CODE STROKE ACTIVE YN
No Patient returned a call. RN went over results and provider's recommendations. Pt verbalized an understanding. No further questions. Will call back to schedule 6 months follow up.

## 2025-04-09 NOTE — ED ADULT NURSE REASSESSMENT NOTE - NS ED NURSE REASSESS COMMENT FT1
RW pt stated no dizziness at present denies N/blur vision/no weakness to upper or lower extrem.  some mild right side HA, pt to be re-eval. by resident.      Alisa Bullard RN

## 2025-04-10 ENCOUNTER — NON-APPOINTMENT (OUTPATIENT)
Age: 35
End: 2025-04-10

## 2025-04-10 ENCOUNTER — APPOINTMENT (OUTPATIENT)
Dept: GASTROENTEROLOGY | Facility: CLINIC | Age: 35
End: 2025-04-10
Payer: COMMERCIAL

## 2025-04-10 VITALS
SYSTOLIC BLOOD PRESSURE: 130 MMHG | HEART RATE: 81 BPM | BODY MASS INDEX: 21 KG/M2 | DIASTOLIC BLOOD PRESSURE: 89 MMHG | WEIGHT: 123 LBS | HEIGHT: 64 IN

## 2025-04-10 DIAGNOSIS — K62.5 HEMORRHAGE OF ANUS AND RECTUM: ICD-10-CM

## 2025-04-10 DIAGNOSIS — K92.1 MELENA: ICD-10-CM

## 2025-04-10 PROCEDURE — 99214 OFFICE O/P EST MOD 30 MIN: CPT

## 2025-04-10 RX ORDER — POLYETHYLENE GLYCOL 3350 AND ELECTROLYTES WITH LEMON FLAVOR 236; 22.74; 6.74; 5.86; 2.97 G/4L; G/4L; G/4L; G/4L; G/4L
236 POWDER, FOR SOLUTION ORAL
Qty: 1 | Refills: 0 | Status: COMPLETED | COMMUNITY
Start: 2025-04-10 | End: 2025-04-11

## 2025-04-18 ENCOUNTER — APPOINTMENT (OUTPATIENT)
Dept: ORTHOPEDIC SURGERY | Facility: CLINIC | Age: 35
End: 2025-04-18
Payer: OTHER MISCELLANEOUS

## 2025-04-18 DIAGNOSIS — M54.50 LOW BACK PAIN, UNSPECIFIED: ICD-10-CM

## 2025-04-18 DIAGNOSIS — S39.012A STRAIN OF MUSCLE, FASCIA AND TENDON OF LOWER BACK, INITIAL ENCOUNTER: ICD-10-CM

## 2025-04-18 DIAGNOSIS — M41.9 SCOLIOSIS, UNSPECIFIED: ICD-10-CM

## 2025-04-18 PROCEDURE — 99203 OFFICE O/P NEW LOW 30 MIN: CPT

## 2025-04-18 PROCEDURE — 72100 X-RAY EXAM L-S SPINE 2/3 VWS: CPT

## 2025-06-23 ENCOUNTER — APPOINTMENT (OUTPATIENT)
Dept: GASTROENTEROLOGY | Facility: CLINIC | Age: 35
End: 2025-06-23
Payer: COMMERCIAL

## 2025-06-23 PROCEDURE — 84703 CHORIONIC GONADOTROPIN ASSAY: CPT | Mod: 59,QW

## 2025-06-23 PROCEDURE — 45378 DIAGNOSTIC COLONOSCOPY: CPT

## 2025-07-31 DIAGNOSIS — R35.0 FREQUENCY OF MICTURITION: ICD-10-CM

## 2025-08-01 ENCOUNTER — APPOINTMENT (OUTPATIENT)
Dept: OBGYN | Facility: CLINIC | Age: 35
End: 2025-08-01
Payer: COMMERCIAL

## 2025-08-01 PROCEDURE — ZZZZZ: CPT

## 2025-08-03 LAB
APPEARANCE: CLEAR
BACTERIA UR CULT: NORMAL
BACTERIA: ABNORMAL /HPF
BILIRUBIN URINE: NEGATIVE
BLOOD URINE: NEGATIVE
CAST: 3 /LPF
COLOR: YELLOW
EPITHELIAL CELLS: 10 /HPF
GLUCOSE QUALITATIVE U: NEGATIVE MG/DL
KETONES URINE: NEGATIVE MG/DL
LEUKOCYTE ESTERASE URINE: ABNORMAL
MICROSCOPIC-UA: NORMAL
NITRITE URINE: NEGATIVE
PH URINE: 6
PROTEIN URINE: NEGATIVE MG/DL
RED BLOOD CELLS URINE: 0 /HPF
REVIEW: NORMAL
SPECIFIC GRAVITY URINE: 1.01
UROBILINOGEN URINE: 0.2 MG/DL
WHITE BLOOD CELLS URINE: 8 /HPF
YEAST-LIKE CELLS: PRESENT

## 2025-08-04 ENCOUNTER — APPOINTMENT (OUTPATIENT)
Dept: OBGYN | Facility: CLINIC | Age: 35
End: 2025-08-04
Payer: COMMERCIAL

## 2025-08-04 VITALS — SYSTOLIC BLOOD PRESSURE: 153 MMHG | DIASTOLIC BLOOD PRESSURE: 83 MMHG

## 2025-08-04 VITALS
HEIGHT: 64 IN | WEIGHT: 126 LBS | SYSTOLIC BLOOD PRESSURE: 160 MMHG | BODY MASS INDEX: 21.51 KG/M2 | DIASTOLIC BLOOD PRESSURE: 100 MMHG

## 2025-08-04 VITALS
BODY MASS INDEX: 21.51 KG/M2 | DIASTOLIC BLOOD PRESSURE: 100 MMHG | HEIGHT: 64 IN | WEIGHT: 126 LBS | SYSTOLIC BLOOD PRESSURE: 160 MMHG

## 2025-08-04 DIAGNOSIS — Z01.419 ENCOUNTER FOR GYNECOLOGICAL EXAMINATION (GENERAL) (ROUTINE) W/OUT ABNORMAL FINDINGS: ICD-10-CM

## 2025-08-04 DIAGNOSIS — N64.4 MASTODYNIA: ICD-10-CM

## 2025-08-04 PROCEDURE — 99459 PELVIC EXAMINATION: CPT

## 2025-08-04 PROCEDURE — G0444 DEPRESSION SCREEN ANNUAL: CPT | Mod: 59

## 2025-08-04 PROCEDURE — 99385 PREV VISIT NEW AGE 18-39: CPT

## 2025-08-06 ENCOUNTER — APPOINTMENT (OUTPATIENT)
Dept: INTERNAL MEDICINE | Facility: CLINIC | Age: 35
End: 2025-08-06
Payer: COMMERCIAL

## 2025-08-06 VITALS
HEIGHT: 64 IN | TEMPERATURE: 98.3 F | HEART RATE: 93 BPM | OXYGEN SATURATION: 97 % | SYSTOLIC BLOOD PRESSURE: 142 MMHG | WEIGHT: 129 LBS | BODY MASS INDEX: 22.02 KG/M2 | DIASTOLIC BLOOD PRESSURE: 97 MMHG

## 2025-08-06 VITALS — DIASTOLIC BLOOD PRESSURE: 78 MMHG | SYSTOLIC BLOOD PRESSURE: 121 MMHG

## 2025-08-06 DIAGNOSIS — Z91.018 ALLERGY TO OTHER FOODS: ICD-10-CM

## 2025-08-06 LAB
C TRACH RRNA SPEC QL NAA+PROBE: NOT DETECTED
HPV HIGH+LOW RISK DNA PNL CVX: NOT DETECTED
N GONORRHOEA RRNA SPEC QL NAA+PROBE: NOT DETECTED
SOURCE AMPLIFICATION: NORMAL

## 2025-08-06 PROCEDURE — G2211 COMPLEX E/M VISIT ADD ON: CPT

## 2025-08-06 PROCEDURE — 99214 OFFICE O/P EST MOD 30 MIN: CPT

## 2025-08-07 ENCOUNTER — NON-APPOINTMENT (OUTPATIENT)
Age: 35
End: 2025-08-07

## 2025-08-07 LAB — CYTOLOGY CVX/VAG DOC THIN PREP: ABNORMAL

## 2025-08-08 LAB
HPV 16 E6+E7 MRNA CVX QL NAA+PROBE: NOT DETECTED
HPV18+45 E6+E7 MRNA CVX QL NAA+PROBE: NOT DETECTED

## 2025-09-04 ENCOUNTER — EMERGENCY (EMERGENCY)
Facility: HOSPITAL | Age: 35
LOS: 1 days | End: 2025-09-04
Attending: EMERGENCY MEDICINE | Admitting: EMERGENCY MEDICINE
Payer: COMMERCIAL

## 2025-09-04 VITALS
HEART RATE: 106 BPM | TEMPERATURE: 98 F | WEIGHT: 123.02 LBS | HEIGHT: 64 IN | SYSTOLIC BLOOD PRESSURE: 156 MMHG | OXYGEN SATURATION: 99 % | RESPIRATION RATE: 18 BRPM | DIASTOLIC BLOOD PRESSURE: 100 MMHG

## 2025-09-04 LAB
ALBUMIN SERPL ELPH-MCNC: 4 G/DL — SIGNIFICANT CHANGE UP (ref 3.3–5)
ALP SERPL-CCNC: 53 U/L — SIGNIFICANT CHANGE UP (ref 40–120)
ALT FLD-CCNC: 11 U/L — SIGNIFICANT CHANGE UP (ref 4–33)
ANION GAP SERPL CALC-SCNC: 11 MMOL/L — SIGNIFICANT CHANGE UP (ref 7–14)
AST SERPL-CCNC: 19 U/L — SIGNIFICANT CHANGE UP (ref 4–32)
BASOPHILS # BLD AUTO: 0 K/UL — SIGNIFICANT CHANGE UP (ref 0–0.2)
BASOPHILS NFR BLD AUTO: 0 % — SIGNIFICANT CHANGE UP (ref 0–2)
BILIRUB SERPL-MCNC: 0.3 MG/DL — SIGNIFICANT CHANGE UP (ref 0.2–1.2)
BUN SERPL-MCNC: 10 MG/DL — SIGNIFICANT CHANGE UP (ref 7–23)
CALCIUM SERPL-MCNC: 8.5 MG/DL — SIGNIFICANT CHANGE UP (ref 8.4–10.5)
CHLORIDE SERPL-SCNC: 106 MMOL/L — SIGNIFICANT CHANGE UP (ref 98–107)
CO2 SERPL-SCNC: 22 MMOL/L — SIGNIFICANT CHANGE UP (ref 22–31)
CREAT SERPL-MCNC: 0.59 MG/DL — SIGNIFICANT CHANGE UP (ref 0.5–1.3)
EGFR: 120 ML/MIN/1.73M2 — SIGNIFICANT CHANGE UP
EGFR: 120 ML/MIN/1.73M2 — SIGNIFICANT CHANGE UP
EOSINOPHIL # BLD AUTO: 0 K/UL — SIGNIFICANT CHANGE UP (ref 0–0.5)
EOSINOPHIL NFR BLD AUTO: 0 % — SIGNIFICANT CHANGE UP (ref 0–6)
FLUAV AG NPH QL: SIGNIFICANT CHANGE UP
FLUBV AG NPH QL: SIGNIFICANT CHANGE UP
GLUCOSE SERPL-MCNC: 85 MG/DL — SIGNIFICANT CHANGE UP (ref 70–99)
HCT VFR BLD CALC: 40.4 % — SIGNIFICANT CHANGE UP (ref 34.5–45)
HGB BLD-MCNC: 13 G/DL — SIGNIFICANT CHANGE UP (ref 11.5–15.5)
IMM GRANULOCYTES # BLD AUTO: 0.01 K/UL — SIGNIFICANT CHANGE UP (ref 0–0.07)
IMM GRANULOCYTES NFR BLD AUTO: 0.2 % — SIGNIFICANT CHANGE UP (ref 0–0.9)
LYMPHOCYTES # BLD AUTO: 1.27 K/UL — SIGNIFICANT CHANGE UP (ref 1–3.3)
LYMPHOCYTES NFR BLD AUTO: 30.5 % — SIGNIFICANT CHANGE UP (ref 13–44)
MCHC RBC-ENTMCNC: 30.9 PG — SIGNIFICANT CHANGE UP (ref 27–34)
MCHC RBC-ENTMCNC: 32.2 G/DL — SIGNIFICANT CHANGE UP (ref 32–36)
MCV RBC AUTO: 96 FL — SIGNIFICANT CHANGE UP (ref 80–100)
MONOCYTES # BLD AUTO: 0.43 K/UL — SIGNIFICANT CHANGE UP (ref 0–0.9)
MONOCYTES NFR BLD AUTO: 10.3 % — SIGNIFICANT CHANGE UP (ref 2–14)
NEUTROPHILS # BLD AUTO: 2.46 K/UL — SIGNIFICANT CHANGE UP (ref 1.8–7.4)
NEUTROPHILS NFR BLD AUTO: 59 % — SIGNIFICANT CHANGE UP (ref 43–77)
NRBC # BLD AUTO: 0 K/UL — SIGNIFICANT CHANGE UP (ref 0–0)
NRBC # FLD: 0 K/UL — SIGNIFICANT CHANGE UP (ref 0–0)
NRBC BLD AUTO-RTO: 0 /100 WBCS — SIGNIFICANT CHANGE UP (ref 0–0)
PLATELET # BLD AUTO: 208 K/UL — SIGNIFICANT CHANGE UP (ref 150–400)
PMV BLD: 10.7 FL — SIGNIFICANT CHANGE UP (ref 7–13)
POTASSIUM SERPL-MCNC: 4 MMOL/L — SIGNIFICANT CHANGE UP (ref 3.5–5.3)
POTASSIUM SERPL-SCNC: 4 MMOL/L — SIGNIFICANT CHANGE UP (ref 3.5–5.3)
PROT SERPL-MCNC: 7.5 G/DL — SIGNIFICANT CHANGE UP (ref 6–8.3)
RBC # BLD: 4.21 M/UL — SIGNIFICANT CHANGE UP (ref 3.8–5.2)
RBC # FLD: 12.2 % — SIGNIFICANT CHANGE UP (ref 10.3–14.5)
RSV RNA NPH QL NAA+NON-PROBE: SIGNIFICANT CHANGE UP
SARS-COV-2 RNA SPEC QL NAA+PROBE: DETECTED
SODIUM SERPL-SCNC: 139 MMOL/L — SIGNIFICANT CHANGE UP (ref 135–145)
SOURCE RESPIRATORY: SIGNIFICANT CHANGE UP
TROPONIN T, HIGH SENSITIVITY RESULT: <6 NG/L — SIGNIFICANT CHANGE UP
WBC # BLD: 4.17 K/UL — SIGNIFICANT CHANGE UP (ref 3.8–10.5)
WBC # FLD AUTO: 4.17 K/UL — SIGNIFICANT CHANGE UP (ref 3.8–10.5)

## 2025-09-04 PROCEDURE — 99284 EMERGENCY DEPT VISIT MOD MDM: CPT

## 2025-09-04 PROCEDURE — 93010 ELECTROCARDIOGRAM REPORT: CPT

## 2025-09-15 ENCOUNTER — APPOINTMENT (OUTPATIENT)
Dept: CARDIOLOGY | Facility: CLINIC | Age: 35
End: 2025-09-15
Payer: COMMERCIAL

## 2025-09-15 VITALS
WEIGHT: 125 LBS | BODY MASS INDEX: 21.34 KG/M2 | DIASTOLIC BLOOD PRESSURE: 88 MMHG | OXYGEN SATURATION: 99 % | HEART RATE: 101 BPM | HEIGHT: 64 IN | SYSTOLIC BLOOD PRESSURE: 129 MMHG

## 2025-09-15 DIAGNOSIS — I10 ESSENTIAL (PRIMARY) HYPERTENSION: ICD-10-CM

## 2025-09-15 PROBLEM — K21.9 GASTRO-ESOPHAGEAL REFLUX DISEASE WITHOUT ESOPHAGITIS: Chronic | Status: ACTIVE | Noted: 2025-09-04

## 2025-09-15 PROCEDURE — 99204 OFFICE O/P NEW MOD 45 MIN: CPT

## 2025-09-15 PROCEDURE — 93000 ELECTROCARDIOGRAM COMPLETE: CPT

## 2025-09-15 PROCEDURE — G2211 COMPLEX E/M VISIT ADD ON: CPT

## 2025-09-17 ENCOUNTER — APPOINTMENT (OUTPATIENT)
Dept: OTOLARYNGOLOGY | Facility: CLINIC | Age: 35
End: 2025-09-17
Payer: COMMERCIAL

## 2025-09-17 VITALS
HEART RATE: 99 BPM | SYSTOLIC BLOOD PRESSURE: 143 MMHG | TEMPERATURE: 98.2 F | HEIGHT: 64 IN | WEIGHT: 125 LBS | DIASTOLIC BLOOD PRESSURE: 93 MMHG | BODY MASS INDEX: 21.34 KG/M2 | OXYGEN SATURATION: 99 %

## 2025-09-17 DIAGNOSIS — H93.293 OTHER ABNORMAL AUDITORY PERCEPTIONS, BILATERAL: ICD-10-CM

## 2025-09-17 DIAGNOSIS — R42 DIZZINESS AND GIDDINESS: ICD-10-CM

## 2025-09-17 PROCEDURE — 92567 TYMPANOMETRY: CPT

## 2025-09-17 PROCEDURE — 99203 OFFICE O/P NEW LOW 30 MIN: CPT

## 2025-09-17 PROCEDURE — 92557 COMPREHENSIVE HEARING TEST: CPT
